# Patient Record
Sex: MALE | Race: WHITE | NOT HISPANIC OR LATINO | Employment: FULL TIME | URBAN - NONMETROPOLITAN AREA
[De-identification: names, ages, dates, MRNs, and addresses within clinical notes are randomized per-mention and may not be internally consistent; named-entity substitution may affect disease eponyms.]

---

## 2019-06-03 ENCOUNTER — OFFICE VISIT (OUTPATIENT)
Dept: CARDIOLOGY | Facility: CLINIC | Age: 54
End: 2019-06-03

## 2019-06-03 VITALS
HEART RATE: 56 BPM | DIASTOLIC BLOOD PRESSURE: 90 MMHG | BODY MASS INDEX: 32.23 KG/M2 | SYSTOLIC BLOOD PRESSURE: 138 MMHG | HEIGHT: 72 IN | WEIGHT: 238 LBS

## 2019-06-03 DIAGNOSIS — E11.9 TYPE 2 DIABETES MELLITUS WITHOUT COMPLICATION, WITHOUT LONG-TERM CURRENT USE OF INSULIN (HCC): ICD-10-CM

## 2019-06-03 DIAGNOSIS — I10 ESSENTIAL HYPERTENSION: ICD-10-CM

## 2019-06-03 DIAGNOSIS — E78.1 HYPERTRIGLYCERIDEMIA: ICD-10-CM

## 2019-06-03 DIAGNOSIS — R00.1 BRADYCARDIA, SINUS: ICD-10-CM

## 2019-06-03 DIAGNOSIS — R07.89 CHEST PAIN, ATYPICAL: ICD-10-CM

## 2019-06-03 DIAGNOSIS — G47.30 SLEEP APNEA IN ADULT: ICD-10-CM

## 2019-06-03 DIAGNOSIS — R06.02 SHORTNESS OF BREATH: Primary | ICD-10-CM

## 2019-06-03 DIAGNOSIS — E88.81 METABOLIC SYNDROME: ICD-10-CM

## 2019-06-03 PROBLEM — E88.810 METABOLIC SYNDROME: Status: ACTIVE | Noted: 2019-06-03

## 2019-06-03 PROCEDURE — 93000 ELECTROCARDIOGRAM COMPLETE: CPT | Performed by: INTERNAL MEDICINE

## 2019-06-03 PROCEDURE — 99244 OFF/OP CNSLTJ NEW/EST MOD 40: CPT | Performed by: INTERNAL MEDICINE

## 2019-06-03 RX ORDER — HYDROCHLOROTHIAZIDE 25 MG/1
25 TABLET ORAL DAILY
Qty: 30 TABLET | Refills: 11 | Status: SHIPPED | OUTPATIENT
Start: 2019-06-03 | End: 2021-04-02

## 2019-06-03 RX ORDER — ASPIRIN 81 MG/1
81 TABLET ORAL DAILY
COMMUNITY

## 2019-06-03 RX ORDER — FENOFIBRATE 145 MG/1
145 TABLET, COATED ORAL DAILY
COMMUNITY

## 2019-06-03 RX ORDER — CHLORAL HYDRATE 500 MG
1000 CAPSULE ORAL
COMMUNITY

## 2019-06-03 RX ORDER — LOSARTAN POTASSIUM 50 MG/1
50 TABLET ORAL DAILY
COMMUNITY
End: 2021-09-14 | Stop reason: SDUPTHER

## 2019-06-03 RX ORDER — ESOMEPRAZOLE MAGNESIUM 40 MG/1
40 CAPSULE, DELAYED RELEASE ORAL
COMMUNITY

## 2019-06-03 NOTE — PROGRESS NOTES
Chief Complaint   Patient presents with   • Establish Care     Referred for shortness of breath. 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 Reports that he has had some chest pain, but nothing he would count as major. Reports that he occasionally has some palpitations. Requested labs from PCP. Records from [facility] are in door, last stress and echo from 2009 and last office note from 2013.    • Aspirin     Patient is on aspirin.         CARDIAC COMPLAINTS  chest pressure/discomfort, dyspnea, fatigue and palpitations      Subjective   Brian LILI Winters is a 53 y.o. male came in today for his initial cardiac evaluation.  He has history of hypertension, diabetes and hypertriglyceridemia, who also has family history of stroke and ischemic heart disease.  I have seen him almost 6 years ago for chest discomfort and underwent investigation.  His LV function was normal.  He had no ischemia but he did have some mild hypertensive blood pressure response for which he was put on ACE inhibitors.  I have not seen him since then.  Apparently he started developing cough and the ACE inhibitors was changed to ARB.  He recently started noticing increasing shortness of breath for the last few months.  He was coaching softball during the spring and he started noticing increasing shortness of breath on exertion associated with chest discomfort.  The chest discomfort was more pressure-like feeling which occurs both during exertion as well as at rest.  The coaching is over now and he and his wife have been trying to exercise more regularly.  He is feeling little better now.  He apparently had some labs done at your office recently but he does not know the results.  Since the symptoms have restarted, he is now referred for further evaluation.  His A1c has been running pretty high in the past and is trying to get it down.  He is trying to go on a strict diet.  He does have history of snoring and he  also has history of falling asleep during the daytime.  He is not a smoker and has stated does have significant family history of vascular disease.  He is not taking any statins at this time.    Past Surgical History:   Procedure Laterality Date   • CARDIOVASCULAR STRESS TEST  07/14/2009    9 Min. 10.2 METS. 86% THR. BP- 184/92. EF 64%. Negative.   • ECHO - CONVERTED  05/12/2009    EF > 60%. Mild MR. RVSP- 36 mmHg.       Current Outpatient Medications   Medication Sig Dispense Refill   • aspirin 81 MG EC tablet Take 81 mg by mouth Daily.     • Cholecalciferol (VITAMIN D PO) Take  by mouth.     • CINNAMON PO Take  by mouth.     • Coenzyme Q10 (COQ-10 PO) Take  by mouth.     • esomeprazole (nexIUM) 40 MG capsule Take 40 mg by mouth Every Morning Before Breakfast.     • fenofibrate (TRICOR) 145 MG tablet Take 145 mg by mouth Daily.     • losartan (COZAAR) 50 MG tablet Take 50 mg by mouth Daily.     • metFORMIN (GLUCOPHAGE) 500 MG tablet Take 500 mg by mouth 3 (Three) Times a Day.     • Omega-3 Fatty Acids (FISH OIL) 1000 MG capsule capsule Take 1,000 mg by mouth Daily With Breakfast.     • hydrochlorothiazide (HYDRODIURIL) 25 MG tablet Take 1 tablet by mouth Daily. 30 tablet 11     No current facility-administered medications for this visit.            ALLERGIES:  Lisinopril    Past Medical History:   Diagnosis Date   • Diabetes mellitus (CMS/HCC)    • History of vasectomy    • Hyperlipidemia    • Hypertension    • Palpitations    • Paresthesia of skin    • Testicular hypofunction        Social History     Tobacco Use   Smoking Status Never Smoker   Smokeless Tobacco Never Used          Family History   Problem Relation Age of Onset   • Transient ischemic attack Mother    • Hypertension Mother    • Diabetes Mother    • Stroke Father    • Transient ischemic attack Father    • Hypertension Father    • Diabetes Brother    • Heart attack Maternal Uncle    • Diabetes Paternal Grandmother    • Diabetes Paternal Grandfather   "      Review of Systems   Constitution: Positive for malaise/fatigue. Negative for decreased appetite.   HENT: Negative for congestion and sore throat.    Eyes: Negative for blurred vision.   Cardiovascular: Positive for chest pain, dyspnea on exertion and palpitations.   Respiratory: Positive for shortness of breath and snoring.    Endocrine: Negative for cold intolerance and heat intolerance.   Hematologic/Lymphatic: Negative for adenopathy. Does not bruise/bleed easily.   Skin: Negative for itching, nail changes and skin cancer.   Musculoskeletal: Negative for arthritis and myalgias.   Gastrointestinal: Negative for abdominal pain, dysphagia and heartburn.   Genitourinary: Negative for bladder incontinence and frequency.   Neurological: Positive for excessive daytime sleepiness. Negative for dizziness, light-headedness, seizures and vertigo.   Psychiatric/Behavioral: Negative for altered mental status.   Allergic/Immunologic: Negative for environmental allergies and hives.       Diabetes- Yes  Thyroid- normal    Objective     /90 (BP Location: Right arm)   Pulse 56   Ht 182.9 cm (72\")   Wt 108 kg (238 lb)   BMI 32.28 kg/m²     Physical Exam   Constitutional: He is oriented to person, place, and time. He appears well-developed and well-nourished.   HENT:   Head: Normocephalic.   Nose: Nose normal.   Eyes: EOM are normal. Pupils are equal, round, and reactive to light.   Neck: Normal range of motion. Neck supple.   Cardiovascular: Regular rhythm, S1 normal and S2 normal. Bradycardia present.   Murmur heard.  Pulmonary/Chest: Effort normal and breath sounds normal.   Abdominal: Soft. Bowel sounds are normal.   Musculoskeletal: Normal range of motion. He exhibits no edema.   Neurological: He is alert and oriented to person, place, and time.   Skin: Skin is warm and dry.   Psychiatric: He has a normal mood and affect.         ECG 12 Lead  Date/Time: 6/3/2019 12:14 PM  Performed by: Talha Melvin, " MD  Authorized by: Talha Melvin MD   Previous ECG: no previous ECG available  Rhythm: sinus bradycardia  Rate: bradycardic  T inversion: II, III and aVF  QRS axis: normal    Clinical impression: non-specific ECG              Assessment/Plan   Patient's Body mass index is 32.28 kg/m². BMI is above normal parameters. Recommendations include: educational material, exercise counseling and nutrition counseling.     Brian was seen today for establish care and aspirin.    Diagnoses and all orders for this visit:    Shortness of breath  -     hydrochlorothiazide (HYDRODIURIL) 25 MG tablet; Take 1 tablet by mouth Daily.  -     Adult Transthoracic Echo Complete W/ Cont if Necessary Per Protocol; Future    Chest pain, atypical  -     Stress Test With Myocardial Perfusion One Day; Future    Metabolic syndrome    Essential hypertension  -     hydrochlorothiazide (HYDRODIURIL) 25 MG tablet; Take 1 tablet by mouth Daily.    Type 2 diabetes mellitus without complication, without long-term current use of insulin (CMS/[facility])    Hypertriglyceridemia    Bradycardia, sinus    Sleep apnea in adult  -     Overnight Sleep Oximetry Study; Future    At baseline, he is bradycardic with mild increased blood pressure.  His EKG showed sinus bradycardia with T wave changes in the inferior leads.  His clinical examination reveals a BMI of 32.  He does have short systolic murmur at the mitral area and there is no evidence of pedal edema.  I had a very long talk with him about his BMI.  I talked to him about different diets and gave him papers on Mediterranean diet.  Regarding his blood pressure, I started him on hydrochlorothiazide 25 mg once a day which might help with his shortness of breath also.  Regarding the diabetes, he is advised to talk to you.  Regarding the hypertriglyceridemia he is on TriCor.  If his LDL is high, consider changing it to a statin.  Regarding the bradycardia he is completely asymptomatic we will continue to  observe.  He does have symptoms of sleep apnea so I am going to check his nocturnal pulse PO2 measurement.  If it is abnormal he may need to undergo sleep study.  Regarding the chest pain, it is atypical and it could be related to blood pressure but I cannot rule out ischemia.  I scheduled him to undergo a repeat stress test to evaluate his functional status, chronotropic response, blood pressure response and to rule out stress-induced ischemia.  He also need an echocardiogram to evaluate for LVH, valvular structures and the PA pressure.  Based on the results, further recommendations will be made.  Regarding his diabetes, I talked to him about freestyle gisselle to monitor it more closely.  If his postprandial blood sugar goes up pretty high, consider adding Prandin.               Electronically signed by Talha Melvin MD Lenore 3, 2019 12:03 PM

## 2019-06-03 NOTE — PATIENT INSTRUCTIONS
Mediterranean Diet  A Mediterranean diet refers to food and lifestyle choices that are based on the traditions of countries located on the [place]. This way of eating has been shown to help prevent certain conditions and improve outcomes for people who have chronic diseases, like kidney disease and heart disease.  What are tips for following this plan?  Lifestyle  · Cook and eat meals together with your family, when possible.  · Drink enough fluid to keep your urine clear or pale yellow.  · Be physically active every day. This includes:  ? Aerobic exercise like running or swimming.  ? Leisure activities like gardening, walking, or housework.  · Get 7-8 hours of sleep each night.  · If recommended by your health care provider, drink red wine in moderation. This means 1 glass a day for nonpregnant women and 2 glasses a day for men. A glass of wine equals 5 oz (150 mL).  Reading food labels  · Check the serving size of packaged foods. For foods such as rice and pasta, the serving size refers to the amount of cooked product, not dry.  · Check the total fat in packaged foods. Avoid foods that have saturated fat or trans fats.  · Check the ingredients list for added sugars, such as corn syrup.  Shopping  · At the grocery store, buy most of your food from the areas near the walls of the store. This includes:  ? Fresh fruits and vegetables (produce).  ? Grains, beans, nuts, and seeds. Some of these may be available in unpackaged forms or large amounts (in bulk).  ? Fresh seafood.  ? Poultry and eggs.  ? Low-fat dairy products.  · Buy whole ingredients instead of prepackaged foods.  · Buy fresh fruits and vegetables in-season from local farmers markets.  · Buy frozen fruits and vegetables in resealable bags.  · If you do not have access to quality fresh seafood, buy precooked frozen shrimp or canned fish, such as tuna, salmon, or sardines.  · Buy small amounts of raw or cooked vegetables, salads, or olives from the  deli or salad bar at your store.  · Stock your pantry so you always have certain foods on hand, such as olive oil, canned tuna, canned tomatoes, rice, pasta, and beans.  Cooking  · Cook foods with extra-virgin olive oil instead of using butter or other vegetable oils.  · Have meat as a side dish, and have vegetables or grains as your main dish. This means having meat in small portions or adding small amounts of meat to foods like pasta or stew.  · Use beans or vegetables instead of meat in common dishes like chili or lasagna.  · [place] with different cooking methods. Try roasting or broiling vegetables instead of steaming or sautéeing them.  · Add frozen vegetables to soups, stews, pasta, or rice.  · Add nuts or seeds for added healthy fat at each meal. You can add these to yogurt, salads, or vegetable dishes.  · Marinate fish or vegetables using olive oil, lemon juice, garlic, and fresh herbs.  Meal planning  · Plan to eat 1 vegetarian meal one day each week. Try to work up to 2 vegetarian meals, if possible.  · Eat seafood 2 or more times a week.  · Have healthy snacks readily available, such as:  ? Vegetable sticks with hummus.  ? Greek yogurt.  ? Fruit and nut trail mix.  · Eat balanced meals throughout the week. This includes:  ? Fruit: 2-3 servings a day  ? Vegetables: 4-5 servings a day  ? Low-fat dairy: 2 servings a day  ? Fish, poultry, or lean meat: 1 serving a day  ? Beans and legumes: 2 or more servings a week  ? Nuts and seeds: 1-2 servings a day  ? Whole grains: 6-8 servings a day  ? Extra-virgin olive oil: 3-4 servings a day  · Limit red meat and sweets to only a few servings a month  What are my food choices?  · Mediterranean diet  ? Recommended  ? Grains: Whole-grain pasta. Brown rice. Bulgar wheat. Polenta. Couscous. Whole-wheat bread. Oatmeal. Quinoa.  ? Vegetables: Artichokes. Beets. Broccoli. Cabbage. Carrots. Eggplant. Green beans. Chard. Kale. Spinach. Onions. Leeks. Peas. Squash.  Tomatoes. Peppers. Radishes.  ? Fruits: Apples. Apricots. Avocado. Berries. Bananas. Cherries. Dates. Figs. Grapes. Astrid. Melon. Oranges. Peaches. Plums. Pomegranate.  ? Meats and other protein foods: Beans. Almonds. Sunflower seeds. Pine nuts. Peanuts. Cod. Mccammon. Scallops. Shrimp. Tuna. Tilapia. Clams. Oysters. Eggs.  ? Dairy: Low-fat milk. Cheese. Greek yogurt.  ? Beverages: Water. Red wine. Herbal tea.  ? Fats and oils: Extra virgin olive oil. Avocado oil. Grape seed oil.  ? Sweets and desserts: Greek yogurt with honey. Baked apples. Poached pears. Trail mix.  ? Seasoning and other foods: Basil. Cilantro. Coriander. Cumin. Mint. Parsley. Jose Cruz. Rosemary. Tarragon. Garlic. Oregano. Thyme. Pepper. Balsalmic vinegar. Tahini. Hummus. Tomato sauce. Olives. Mushrooms.  ? Limit these  ? Grains: Prepackaged pasta or rice dishes. Prepackaged cereal with added sugar.  ? Vegetables: Deep fried potatoes (french fries).  ? Fruits: Fruit canned in syrup.  ? Meats and other protein foods: Beef. Pork. Lamb. Poultry with skin. Hot dogs. Brown.  ? Dairy: Ice cream. Sour cream. Whole milk.  ? Beverages: Juice. Sugar-sweetened soft drinks. Beer. Liquor and spirits.  ? Fats and oils: Butter. Canola oil. Vegetable oil. Beef fat (tallow). Lard.  ? Sweets and desserts: Cookies. Cakes. Pies. Candy.  ? Seasoning and other foods: Mayonnaise. Premade sauces and marinades.  ? The items listed may not be a complete list. Talk with your dietitian about what dietary choices are right for you.  Summary  · The Mediterranean diet includes both food and lifestyle choices.  · Eat a variety of fresh fruits and vegetables, beans, nuts, seeds, and whole grains.  · Limit the amount of red meat and sweets that you eat.  · Talk with your health care provider about whether it is safe for you to drink red wine in moderation. This means 1 glass a day for nonpregnant women and 2 glasses a day for men. A glass of wine equals 5 oz (150 mL).  This information  is not intended to replace advice given to you by your health care provider. Make sure you discuss any questions you have with your health care provider.  Document Released: 08/10/2017 Document Revised: 09/12/2017 Document Reviewed: 08/10/2017  [employer] Interactive Patient Education © 2019 [employer]

## 2019-06-06 ENCOUNTER — APPOINTMENT (OUTPATIENT)
Dept: CARDIOLOGY | Facility: HOSPITAL | Age: 54
End: 2019-06-06

## 2019-09-17 ENCOUNTER — OFFICE VISIT (OUTPATIENT)
Dept: CARDIOLOGY | Facility: CLINIC | Age: 54
End: 2019-09-17

## 2019-09-17 VITALS
BODY MASS INDEX: 33.67 KG/M2 | WEIGHT: 248.6 LBS | DIASTOLIC BLOOD PRESSURE: 98 MMHG | SYSTOLIC BLOOD PRESSURE: 148 MMHG | HEIGHT: 72 IN | HEART RATE: 60 BPM

## 2019-09-17 DIAGNOSIS — I10 ESSENTIAL HYPERTENSION: Primary | ICD-10-CM

## 2019-09-17 DIAGNOSIS — E11.9 TYPE 2 DIABETES MELLITUS WITHOUT COMPLICATION, WITHOUT LONG-TERM CURRENT USE OF INSULIN (HCC): ICD-10-CM

## 2019-09-17 DIAGNOSIS — R06.02 SHORTNESS OF BREATH: ICD-10-CM

## 2019-09-17 DIAGNOSIS — E88.81 METABOLIC SYNDROME: ICD-10-CM

## 2019-09-17 DIAGNOSIS — E78.1 HYPERTRIGLYCERIDEMIA: ICD-10-CM

## 2019-09-17 PROCEDURE — 99214 OFFICE O/P EST MOD 30 MIN: CPT | Performed by: NURSE PRACTITIONER

## 2019-09-17 NOTE — PROGRESS NOTES
Chief Complaint   Patient presents with   • Follow-up     Cardiac management. He did not have stress and echo due to copay. He did not receive any results of overnight sleep ox.   • Shortness of Breath     He has had no further problems, he feels problems related to allergies.   • Blood pressure     He reports stable at home. He has not been taking HCTZ.   • Palpitations     Has occasional yet not often only last 3-4 seconds.       Álvaro Winters is a 53 y.o. male with a history of HTN, diabetes, hyperlipidemia and strong family history of stroke and heart disease.  Cardiac work-up in 2009 was negative.  We did not see him for a few years until recently when he returned for evaluation of increasing MCMULLEN and chest discomfort.  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 At consult, HCTZ 25 mg add for elevated bp also the SOB. Cardiac work up was ordered but not completed secondary to exorbitant cost >$2500. Overnight pulse ox was negative. Labs on 5/4/2019 showed  A1C had increased from 6.8 to 10.7%, magnesium 1.9, TSH 3.01, testosterone 212, , , HDL 40, . He admitted to not following a good diet and eating fast food.  Today comes for follow-up feeling a little better.  Shortness of breath has improved.  Blood pressure has been well controlled at home and he has not been taking HCTZ.  He has infrequent, intermittent palpitations where he feels his heart skip a couple times then subsides.  Not associated with dizziness or syncope.    HPI         Cardiac History:    Past Surgical History:   Procedure Laterality Date   • CARDIOVASCULAR STRESS TEST  07/14/2009    9 Min. 10.2 METS. 86% THR. BP- 184/92. EF 64%. Negative.   • ECHO - CONVERTED  05/12/2009    EF > 60%. Mild MR. RVSP- 36 mmHg.       Current Outpatient Medications   Medication Sig Dispense Refill   • aspirin 81 MG EC tablet Take 81 mg by mouth Daily.     • Cholecalciferol (VITAMIN D PO) Take  by  mouth Daily.     • CINNAMON PO Take  by mouth Daily.     • Coenzyme Q10 (COQ-10 PO) Take  by mouth Daily.     • esomeprazole (nexIUM) 40 MG capsule Take 40 mg by mouth Every Morning Before Breakfast.     • fenofibrate (TRICOR) 145 MG tablet Take 145 mg by mouth Daily.     • hydrochlorothiazide (HYDRODIURIL) 25 MG tablet Take 1 tablet by mouth Daily. (Patient taking differently: Take 25 mg by mouth Daily As Needed.) 30 tablet 11   • losartan (COZAAR) 50 MG tablet Take 50 mg by mouth Daily.     • metFORMIN (GLUCOPHAGE) 500 MG tablet Take 500 mg by mouth 3 (Three) Times a Day.     • Omega-3 Fatty Acids (FISH OIL) 1000 MG capsule capsule Take 1,000 mg by mouth Daily With Breakfast.       No current facility-administered medications for this visit.      Lisinopril    Past Medical History:   Diagnosis Date   • Diabetes mellitus (CMS/[facility])    • History of vasectomy    • Hyperlipidemia    • Hypertension    • Palpitations    • Paresthesia of skin    • Testicular hypofunction        Social History     Socioeconomic History   • Marital status:      Spouse name: Not on file   • Number of children: Not on file   • Years of education: Not on file   • Highest education level: Not on file   Tobacco Use   • Smoking status: Never Smoker   • Smokeless tobacco: Never Used   Substance and Sexual Activity   • Alcohol use: Yes     Comment: Occasional   • Drug use: No       Family History   Problem Relation Age of Onset   • Transient ischemic attack Mother    • Hypertension Mother    • Diabetes Mother    • Stroke Father    • Transient ischemic attack Father    • Hypertension Father    • Diabetes Brother    • Heart attack Maternal Uncle    • Diabetes Paternal Grandmother    • Diabetes Paternal Grandfather      Review of Systems   Constitution: Positive for weight gain (increased 10 lb). Negative for decreased appetite, weakness and malaise/fatigue.   HENT: Negative.    Eyes: Negative.    Cardiovascular: Positive for palpitations.  "Negative for chest pain, dyspnea on exertion, leg swelling and orthopnea.   Respiratory: Negative for cough and shortness of breath.    Endocrine: Negative.    Hematologic/Lymphatic: Negative.    Skin: Negative.    Musculoskeletal: Negative.    Gastrointestinal: Negative for abdominal pain and melena.   Genitourinary: Negative for hematuria.   Neurological: Negative for dizziness.   Psychiatric/Behavioral: Negative for altered mental status and depression.   Allergic/Immunologic: Negative.       Diabetes- Yes  Thyroid-normal    Objective     /98 (BP Location: Left arm)   Pulse 60   Ht 182.9 cm (72.01\")   Wt 113 kg (248 lb 9.6 oz)   BMI 33.71 kg/m²      Physical Exam   Constitutional: He is oriented to person, place, and time. He appears well-developed and well-nourished. No distress.   HENT:   Head: Normocephalic.   Eyes: Pupils are equal, round, and reactive to light.   Neck: Normal range of motion.   Cardiovascular: Normal rate, regular rhythm and intact distal pulses.   No murmur heard.  Pulmonary/Chest: Effort normal and breath sounds normal.   Abdominal: Soft. Bowel sounds are normal.   Musculoskeletal: Normal range of motion. He exhibits no edema.   Neurological: He is alert and oriented to person, place, and time.   Skin: Skin is warm and dry. He is not diaphoretic.   Psychiatric: He has a normal mood and affect.   Nursing note and vitals reviewed.     Procedures          Assessment/Plan      Brian was seen today for follow-up, shortness of breath, blood pressure and palpitations.    Diagnoses and all orders for this visit:    Essential hypertension    Hypertriglyceridemia    Shortness of breath    Type 2 diabetes mellitus without complication, without long-term current use of insulin (CMS/[facility])    Metabolic syndrome    1. HTN- elevated today at 148/98. He did have quite a long wait time. Reports lower at home. He is advised to resume daily HCTZ. Continue to monitor with goal of <130/80 with his age " and comorbid diabetes. Limit sodium 1500 mg daily. Weight loss to achieve BMI near 25.     2. Hypertriglyceridemia- not well controlled. Lipids on 5/4/19: , Tri 282, HDL 40, . All numbers increased from near normal in December to elevated in May.  He feels likely due to lack of good diet control.  He has continued fenofibrate and fish oil.  If he is unable to alter his diet significantly, he would benefit from statin therapy in addition to fenofibrate.  Fish oil can be increased to capsules twice daily.  Limit sugars, carbohydrates, saturated fat.    3.  Shortness of breath- improved.  Overnight pulse ox negative.  Clinically, lungs are clear.  Will delay his cardiac work-up until his symptoms are worsened.    4.  Diabetes- not well controlled, A1C 10.7%.  Encouraged to strictly limit sugars and carbohydrates diabetes management per PCP, continue aspirin and ARB.    He was counseled on heart healthy lifestyle including following Mediterranean-style diet, limiting sodium, regular exercise to include 150 minutes moderate intensity exercise weekly.  We will see him back in 6 months for follow-up to ensure he has optimal risk factor management and can be seen yearly or as needed.  If the palpitations worsen, I encouraged him to contact our office and we will place a Holter monitor for further evaluation.      Patient's Body mass index is 33.71 kg/m². BMI is above normal parameters. Recommendations include: nutrition counseling.              Electronically signed by LEXII Ureña,  September 20, 2019 10:59 AM

## 2019-09-17 NOTE — PATIENT INSTRUCTIONS
Mediterranean Diet  A Mediterranean diet refers to food and lifestyle choices that are based on the traditions of countries located on the [place]. This way of eating has been shown to help prevent certain conditions and improve outcomes for people who have chronic diseases, like kidney disease and heart disease.  What are tips for following this plan?  Lifestyle  · Cook and eat meals together with your family, when possible.  · Drink enough fluid to keep your urine clear or pale yellow.  · Be physically active every day. This includes:  ? Aerobic exercise like running or swimming.  ? Leisure activities like gardening, walking, or housework.  · Get 7-8 hours of sleep each night.  · If recommended by your health care provider, drink red wine in moderation. This means 1 glass a day for nonpregnant women and 2 glasses a day for men. A glass of wine equals 5 oz (150 mL).  Reading food labels    · Check the serving size of packaged foods. For foods such as rice and pasta, the serving size refers to the amount of cooked product, not dry.  · Check the total fat in packaged foods. Avoid foods that have saturated fat or trans fats.  · Check the ingredients list for added sugars, such as corn syrup.  Shopping  · At the grocery store, buy most of your food from the areas near the walls of the store. This includes:  ? Fresh fruits and vegetables (produce).  ? Grains, beans, nuts, and seeds. Some of these may be available in unpackaged forms or large amounts (in bulk).  ? Fresh seafood.  ? Poultry and eggs.  ? Low-fat dairy products.  · Buy whole ingredients instead of prepackaged foods.  · Buy fresh fruits and vegetables in-season from local farmers markets.  · Buy frozen fruits and vegetables in resealable bags.  · If you do not have access to quality fresh seafood, buy precooked frozen shrimp or canned fish, such as tuna, salmon, or sardines.  · Buy small amounts of raw or cooked vegetables, salads, or olives from  the deli or salad bar at your store.  · Stock your pantry so you always have certain foods on hand, such as olive oil, canned tuna, canned tomatoes, rice, pasta, and beans.  Cooking  · Cook foods with extra-virgin olive oil instead of using butter or other vegetable oils.  · Have meat as a side dish, and have vegetables or grains as your main dish. This means having meat in small portions or adding small amounts of meat to foods like pasta or stew.  · Use beans or vegetables instead of meat in common dishes like chili or lasagna.  · [place] with different cooking methods. Try roasting or broiling vegetables instead of steaming or sautéeing them.  · Add frozen vegetables to soups, stews, pasta, or rice.  · Add nuts or seeds for added healthy fat at each meal. You can add these to yogurt, salads, or vegetable dishes.  · Marinate fish or vegetables using olive oil, lemon juice, garlic, and fresh herbs.  Meal planning    · Plan to eat 1 vegetarian meal one day each week. Try to work up to 2 vegetarian meals, if possible.  · Eat seafood 2 or more times a week.  · Have healthy snacks readily available, such as:  ? Vegetable sticks with hummus.  ? Greek yogurt.  ? Fruit and nut trail mix.  · Eat balanced meals throughout the week. This includes:  ? Fruit: 2-3 servings a day  ? Vegetables: 4-5 servings a day  ? Low-fat dairy: 2 servings a day  ? Fish, poultry, or lean meat: 1 serving a day  ? Beans and legumes: 2 or more servings a week  ? Nuts and seeds: 1-2 servings a day  ? Whole grains: 6-8 servings a day  ? Extra-virgin olive oil: 3-4 servings a day  · Limit red meat and sweets to only a few servings a month  What are my food choices?  · Mediterranean diet  ? Recommended  ? Grains: Whole-grain pasta. Brown rice. Bulgar wheat. Polenta. Couscous. Whole-wheat bread. Oatmeal. Quinoa.  ? Vegetables: Artichokes. Beets. Broccoli. Cabbage. Carrots. Eggplant. Green beans. Chard. Kale. Spinach. Onions. Leeks. Peas. Squash.  Tomatoes. Peppers. Radishes.  ? Fruits: Apples. Apricots. Avocado. Berries. Bananas. Cherries. Dates. Figs. Grapes. Astrid. Melon. Oranges. Peaches. Plums. Pomegranate.  ? Meats and other protein foods: Beans. Almonds. Sunflower seeds. Pine nuts. Peanuts. Cod. [place]. Scallops. Shrimp. Tuna. Tilapia. Clams. Oysters. Eggs.  ? Dairy: Low-fat milk. Cheese. Greek yogurt.  ? Beverages: Water. Red wine. Herbal tea.  ? Fats and oils: Extra virgin olive oil. Avocado oil. Grape seed oil.  ? Sweets and desserts: Greek yogurt with honey. Baked apples. Poached pears. Trail mix.  ? Seasoning and other foods: Basil. Cilantro. Coriander. Cumin. Mint. Parsley. [place]. Rosemary. Tarragon. Garlic. Oregano. Thyme. Pepper. Balsalmic vinegar. Tahini. Hummus. Tomato sauce. Olives. Mushrooms.  ? Limit these  ? Grains: Prepackaged pasta or rice dishes. Prepackaged cereal with added sugar.  ? Vegetables: Deep fried potatoes (french fries).  ? Fruits: Fruit canned in syrup.  ? Meats and other protein foods: Beef. Pork. Lamb. Poultry with skin. Hot dogs. Brown.  ? Dairy: Ice cream. Sour cream. Whole milk.  ? Beverages: Juice. Sugar-sweetened soft drinks. Beer. Liquor and spirits.  ? Fats and oils: Butter. Canola oil. Vegetable oil. Beef fat (tallow). Lard.  ? Sweets and desserts: Cookies. Cakes. Pies. Candy.  ? Seasoning and other foods: Mayonnaise. Premade sauces and marinades.  ? The items listed may not be a complete list. Talk with your dietitian about what dietary choices are right for you.  Summary  · The Mediterranean diet includes both food and lifestyle choices.  · Eat a variety of fresh fruits and vegetables, beans, nuts, seeds, and whole grains.  · Limit the amount of red meat and sweets that you eat.  · Talk with your health care provider about whether it is safe for you to drink red wine in moderation. This means 1 glass a day for nonpregnant women and 2 glasses a day for men. A glass of wine equals 5 oz (150 mL).  This information  is not intended to replace advice given to you by your health care provider. Make sure you discuss any questions you have with your health care provider.  Document Released: 08/10/2017 Document Revised: 09/12/2017 Document Reviewed: 08/10/2017  [employer] Interactive Patient Education © 2019 [employer]

## 2020-06-02 ENCOUNTER — OFFICE VISIT (OUTPATIENT)
Dept: CARDIOLOGY | Facility: CLINIC | Age: 55
End: 2020-06-02

## 2020-06-02 VITALS
HEIGHT: 72 IN | HEART RATE: 68 BPM | WEIGHT: 249 LBS | BODY MASS INDEX: 33.72 KG/M2 | SYSTOLIC BLOOD PRESSURE: 136 MMHG | DIASTOLIC BLOOD PRESSURE: 78 MMHG

## 2020-06-02 DIAGNOSIS — E66.9 OBESITY (BMI 30.0-34.9): ICD-10-CM

## 2020-06-02 DIAGNOSIS — E11.9 TYPE 2 DIABETES MELLITUS WITHOUT COMPLICATION, WITHOUT LONG-TERM CURRENT USE OF INSULIN (HCC): ICD-10-CM

## 2020-06-02 DIAGNOSIS — I10 ESSENTIAL HYPERTENSION: Primary | ICD-10-CM

## 2020-06-02 DIAGNOSIS — E78.1 HYPERTRIGLYCERIDEMIA: ICD-10-CM

## 2020-06-02 PROCEDURE — 99213 OFFICE O/P EST LOW 20 MIN: CPT | Performed by: NURSE PRACTITIONER

## 2020-06-02 NOTE — PROGRESS NOTES
Chief Complaint   Patient presents with   • Follow-up     For cardiac management. Patient is on aspirin. Last lab work was done a few weeks ago, was faxed, will put in door.    • Med Refill     PCP does medication refills. Went over medications verbally.        Cardiac Complaints  none      Subjective   Brian LILI Winters is a 54 y.o. male  HTN, diabetes, hyperlipidemia, and strong family history of stroke and heart disease.  Cardiac work-up in 2009 was negative.  We did not see him for a few years until recently when he returned for evaluation of increasing MCMULLEN and chest discomfort.  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 At consult, HCTZ 25 mg add for elevated bp also the SOB. Cardiac work up was ordered but not completed secondary to exorbitant cost >$2500. Overnight pulse ox was negative.  Most recent labs from May 2020:  AIC 7.1%, HH 14.9/43.1, PSA 0.4, Testosterone 231, TRIG 131, HDL 42, , GFR 77, Ca 9.1, K 4.0, Na 141, Creatinine 1.08, BUN 11.    Patient returns today for follow up and denies any cardiac concerns. Patient denies chest pain, SOA, palpitations, dizziness, or syncope. He admits he has been trying to do better with his diet and limit his amount of caloric intake as well as carbs since his AIC was so high on his last labs. No refills needed.               Cardiac History  Past Surgical History:   Procedure Laterality Date   • CARDIOVASCULAR STRESS TEST  07/14/2009    9 Min. 10.2 METS. 86% THR. BP- 184/92. EF 64%. Negative.   • ECHO - CONVERTED  05/12/2009    EF > 60%. Mild MR. RVSP- 36 mmHg.       Current Outpatient Medications   Medication Sig Dispense Refill   • aspirin 81 MG EC tablet Take 81 mg by mouth Daily.     • Cholecalciferol (VITAMIN D PO) Take  by mouth Daily.     • CINNAMON PO Take  by mouth Daily.     • Coenzyme Q10 (COQ-10 PO) Take  by mouth Daily.     • esomeprazole (nexIUM) 40 MG capsule Take 40 mg by mouth Every Morning Before Breakfast.     •  fenofibrate (TRICOR) 145 MG tablet Take 145 mg by mouth Daily.     • hydrochlorothiazide (HYDRODIURIL) 25 MG tablet Take 1 tablet by mouth Daily. 30 tablet 11   • losartan (COZAAR) 50 MG tablet Take 50 mg by mouth Daily.     • metFORMIN (GLUCOPHAGE) 500 MG tablet Take 1,000 mg by mouth 2 (Two) Times a Day With Meals.     • Omega-3 Fatty Acids (FISH OIL) 1000 MG capsule capsule Take 1,000 mg by mouth Daily With Breakfast.       No current facility-administered medications for this visit.        Lisinopril    Past Medical History:   Diagnosis Date   • Diabetes mellitus (CMS/HCC)    • History of vasectomy    • Hyperlipidemia    • Hypertension    • Palpitations    • Paresthesia of skin    • Testicular hypofunction        Social History     Socioeconomic History   • Marital status:      Spouse name: Not on file   • Number of children: Not on file   • Years of education: Not on file   • Highest education level: Not on file   Tobacco Use   • Smoking status: Never Smoker   • Smokeless tobacco: Never Used   Substance and Sexual Activity   • Alcohol use: Yes     Comment: Occasional   • Drug use: No       Family History   Problem Relation Age of Onset   • Transient ischemic attack Mother    • Hypertension Mother    • Diabetes Mother    • Stroke Father    • Transient ischemic attack Father    • Hypertension Father    • Diabetes Brother    • Heart attack Maternal Uncle    • Diabetes Paternal Grandmother    • Diabetes Paternal Grandfather        Review of Systems   Constitution: Negative for malaise/fatigue and night sweats.   Cardiovascular: Negative for chest pain, claudication, dyspnea on exertion, irregular heartbeat, leg swelling, near-syncope, orthopnea, palpitations and syncope.   Respiratory: Negative for cough, shortness of breath and wheezing.    Musculoskeletal: Negative for back pain, joint pain and joint swelling.   Gastrointestinal: Negative for anorexia, heartburn, nausea and vomiting.   Genitourinary:  "Negative for dysuria, hematuria, hesitancy and nocturia.   Neurological: Negative for dizziness, light-headedness and loss of balance.   Psychiatric/Behavioral: Negative for depression and memory loss. The patient is not nervous/anxious.            Objective     /78 (BP Location: Right arm)   Pulse 68   Ht 182.9 cm (72.01\")   Wt 113 kg (249 lb)   BMI 33.76 kg/m²     Physical Exam   Constitutional: He is oriented to person, place, and time. He appears well-developed and well-nourished.   HENT:   Head: Normocephalic and atraumatic.   Eyes: Pupils are equal, round, and reactive to light. EOM are normal.   Neck: Normal range of motion. Neck supple.   Cardiovascular: Normal rate and regular rhythm.   Murmur heard.  Pulmonary/Chest: Effort normal and breath sounds normal.   Abdominal: Soft.   Musculoskeletal: Normal range of motion.   Neurological: He is alert and oriented to person, place, and time.   Skin: Skin is warm and dry.   Psychiatric: He has a normal mood and affect. His behavior is normal.       Procedures    Assessment/Plan     HTN: Blood pressure stable but SBP slightly high at 136.  Patient reports at home it has been well managed. He was urged to keep a log. He will continue on current HCTZ and cozaar therapy for now. Limited sodium intake encouraged.     Hyperlipidemia:  Managed with tricor therapy.  Most recent labs show TRIGS much improved. His LDL was noted to be high at 101.  Dietary changes urged. At next labs, if still elevated, please consider statin therapy due to risk factors for cardiac disease.    DM:  Most recent labs show AIC improved from over 10% to around 7.1%. He remains on glucophage therapy in regards and is followed by your office. He was urged to continue to limit sugar/starch intake in his diet.    BMI noted at 33.76, good cardiac ADA diet with limited caloric intake, carbs, and walking regimen encouraged.    6 month follow up recommended or sooner if needed.    NO refills " needed.    Cardiac status stable. Patient does not want any further testing at present as he remains asymptomatic and reports he can not afford secondary to costs.          Problems Addressed this Visit        Cardiovascular and Mediastinum    Hypertriglyceridemia    Essential hypertension - Primary       Endocrine    Type 2 diabetes mellitus without complication, without long-term current use of insulin (CMS/[facility])      Other Visit Diagnoses     Obesity (BMI 30.0-34.9)              Patient's Body mass index is 33.76 kg/m². BMI is above normal parameters. Recommendations include: nutrition counseling.                Electronically signed by LEXII Segal June 2, 2020 17:02

## 2021-02-24 ENCOUNTER — OFFICE VISIT (OUTPATIENT)
Dept: CARDIOLOGY | Facility: CLINIC | Age: 56
End: 2021-02-24

## 2021-02-24 VITALS
BODY MASS INDEX: 34.05 KG/M2 | DIASTOLIC BLOOD PRESSURE: 74 MMHG | SYSTOLIC BLOOD PRESSURE: 142 MMHG | WEIGHT: 251.4 LBS | HEIGHT: 72 IN | HEART RATE: 64 BPM | TEMPERATURE: 97.7 F

## 2021-02-24 DIAGNOSIS — E78.1 HYPERTRIGLYCERIDEMIA: ICD-10-CM

## 2021-02-24 DIAGNOSIS — I10 ESSENTIAL HYPERTENSION: Primary | ICD-10-CM

## 2021-02-24 DIAGNOSIS — E88.81 METABOLIC SYNDROME: ICD-10-CM

## 2021-02-24 DIAGNOSIS — E11.9 TYPE 2 DIABETES MELLITUS WITHOUT COMPLICATION, WITHOUT LONG-TERM CURRENT USE OF INSULIN (HCC): ICD-10-CM

## 2021-02-24 PROCEDURE — 99213 OFFICE O/P EST LOW 20 MIN: CPT | Performed by: NURSE PRACTITIONER

## 2021-02-24 RX ORDER — ORAL SEMAGLUTIDE 3 MG/1
3 TABLET ORAL DAILY
COMMUNITY
End: 2021-09-14 | Stop reason: SINTOL

## 2021-02-24 NOTE — PROGRESS NOTES
Chief Complaint   Patient presents with   • Follow-up     Cardiac management.   • Lab     Last labs 1/2021 per PCP, A1C was elevated. PCP started Ryvelsus 3mg daily.  PCP writes refills on medications.   • Shortness of Breath     Only with over exertion, had Covid 10/2020.     Álvaro Winters is a 55 y.o. male with HTN, diabetes, hyperlipidemia, and strong family history of stroke and heart disease.  Cardiac work-up in 2009 was negative.  We did not see him for a few years until recently when he returned for evaluation of increasing MCMULLEN and chest discomfort.  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 At consult, HCTZ 25 mg add for elevated bp also the SOB. Cardiac work up was ordered but not completed secondary to out-of-pocket cost >$2500. Overnight pulse ox was negative.      He came today for regular follow-up visit.  He denies any new or worsening cardiac symptoms.  He was diagnosed with Covid in October 2020.  Since then, he has noted mild increase in shortness of breath with exertion but slowly improving.  He admits to not following strict diet and not being as active while working virtually.  Labs brought in today showed on 12/31/2020 A1c increased from 7.1 to 8.4%, B12 356, TSH 2.2, CBC, vitamin D 26, testosterone 232, , HDL 40, , , GFR 90.  He was started on Rybelsus.         Cardiac History:    Past Surgical History:   Procedure Laterality Date   • CARDIOVASCULAR STRESS TEST  07/14/2009    9 Min. 10.2 METS. 86% THR. BP- 184/92. EF 64%. Negative.   • ECHO - CONVERTED  05/12/2009    EF > 60%. Mild MR. RVSP- 36 mmHg.     Current Outpatient Medications   Medication Sig Dispense Refill   • aspirin 81 MG EC tablet Take 81 mg by mouth Daily.     • Cholecalciferol (VITAMIN D PO) Take  by mouth Daily.     • CINNAMON PO Take  by mouth Daily.     • Coenzyme Q10 (COQ-10 PO) Take  by mouth Daily.     • esomeprazole (nexIUM) 40 MG capsule Take 40 mg by  mouth Every Morning Before Breakfast.     • fenofibrate (TRICOR) 145 MG tablet Take 145 mg by mouth Daily.     • hydrochlorothiazide (HYDRODIURIL) 25 MG tablet Take 1 tablet by mouth Daily. 30 tablet 11   • losartan (COZAAR) 50 MG tablet Take 50 mg by mouth Daily.     • metFORMIN (GLUCOPHAGE) 500 MG tablet Take 1,000 mg by mouth 2 (Two) Times a Day With Meals.     • Omega-3 Fatty Acids (FISH OIL) 1000 MG capsule capsule Take 1,000 mg by mouth Daily With Breakfast.     • Semaglutide (Rybelsus) 3 MG tablet Take 3 mg by mouth Daily. One tablet daily       No current facility-administered medications for this visit.      Lisinopril    Past Medical History:   Diagnosis Date   • Diabetes mellitus (CMS/HCC)    • History of vasectomy    • Hyperlipidemia    • Hypertension    • Palpitations    • Paresthesia of skin    • Testicular hypofunction      Social History     Socioeconomic History   • Marital status:      Spouse name: Not on file   • Number of children: Not on file   • Years of education: Not on file   • Highest education level: Not on file   Tobacco Use   • Smoking status: Never Smoker   • Smokeless tobacco: Never Used   Substance and Sexual Activity   • Alcohol use: Yes     Comment: Occasional   • Drug use: No     Family History   Problem Relation Age of Onset   • Transient ischemic attack Mother    • Hypertension Mother    • Diabetes Mother    • Stroke Father    • Transient ischemic attack Father    • Hypertension Father    • Diabetes Brother    • Heart attack Maternal Uncle    • Diabetes Paternal Grandmother    • Diabetes Paternal Grandfather      Review of Systems   Constitution: Positive for weight gain (Up 2 pounds). Negative for decreased appetite and malaise/fatigue.   HENT: Negative.    Eyes: Negative for blurred vision.   Cardiovascular: Negative for chest pain, dyspnea on exertion (Very mild), leg swelling, palpitations and syncope.   Respiratory: Negative for shortness of breath and sleep  "disturbances due to breathing.    Endocrine: Negative.    Hematologic/Lymphatic: Negative for bleeding problem. Does not bruise/bleed easily.   Skin: Negative.    Musculoskeletal: Negative for falls and myalgias.   Gastrointestinal: Negative for abdominal pain, heartburn and melena.   Genitourinary: Negative for hematuria.   Neurological: Negative for dizziness and light-headedness.   Psychiatric/Behavioral: Negative for altered mental status.   Allergic/Immunologic: Negative.       Objective     /74 (BP Location: Right arm)   Pulse 64   Temp 97.7 °F (36.5 °C)   Ht 182.9 cm (72.01\")   Wt 114 kg (251 lb 6.4 oz)   BMI 34.09 kg/m²     Vitals signs and nursing note reviewed.   Constitutional:       General: Not in acute distress.     Appearance: Well-developed. Not diaphoretic.   Eyes:      Pupils: Pupils are equal, round, and reactive to light.   HENT:      Head: Normocephalic.   Neck:      Musculoskeletal: Normal range of motion.   Pulmonary:      Effort: Pulmonary effort is normal. No respiratory distress.      Breath sounds: Normal breath sounds.   Cardiovascular:      Normal rate. Regular rhythm.      Murmurs: There is no murmur.   Pulses:     Intact distal pulses.   Edema:     Peripheral edema absent.   Abdominal:      General: Bowel sounds are normal.      Palpations: Abdomen is soft.   Musculoskeletal: Normal range of motion.   Skin:     General: Skin is warm and dry.   Neurological:      Mental Status: Alert and oriented to person, place, and time.        Procedures          Problem List Items Addressed This Visit        Cardiac and Vasculature    Hypertriglyceridemia    Essential hypertension - Primary       Endocrine and Metabolic    Type 2 diabetes mellitus without complication, without long-term current use of insulin (CMS/[facility])    Relevant Medications    Semaglutide (Rybelsus) 3 MG tablet    Metabolic syndrome         1.  HTN-upper normal range today at 142/74.  Limit sodium intake.  Continue " weight loss efforts.  Continue HCTZ 25 mg and losartan 50 mg daily.    2.  Hyperlipidemia- with triglycerides 238.  He is not on statin.  Continue fenofibrate and strict diet.    3.  Diabetes-A1c increased to 8.4%.  He is working hard to reduce his sugar and carbohydrate intake as well as increase activity.  He is tolerating Rybelsus so far.    He has no significant cardiac complaints and due to out-of-pocket cost, we will continue to manage him conservatively.  If he develops any anginal type symptoms, recommend repeating stress test with perfusion scan.  Refills managed by PCP.  Thank you for providing copy of his labs.  Follow-up in 6 months or sooner if needed.    Patient's Body mass index is 34.09 kg/m². BMI is above normal parameters. Recommendations include: nutrition counseling.             Electronically signed by LEXII Ureña,  February 25, 2021 11:57 EST

## 2021-03-01 DIAGNOSIS — I10 ESSENTIAL HYPERTENSION: ICD-10-CM

## 2021-03-01 DIAGNOSIS — R06.02 SHORTNESS OF BREATH: ICD-10-CM

## 2021-03-01 RX ORDER — HYDROCHLOROTHIAZIDE 25 MG/1
TABLET ORAL
Qty: 30 TABLET | Refills: 0 | OUTPATIENT
Start: 2021-03-01

## 2021-03-03 DIAGNOSIS — R06.02 SHORTNESS OF BREATH: ICD-10-CM

## 2021-03-03 DIAGNOSIS — I10 ESSENTIAL HYPERTENSION: ICD-10-CM

## 2021-03-04 RX ORDER — HYDROCHLOROTHIAZIDE 25 MG/1
TABLET ORAL
Qty: 30 TABLET | Refills: 0 | OUTPATIENT
Start: 2021-03-04

## 2021-04-01 DIAGNOSIS — R06.02 SHORTNESS OF BREATH: ICD-10-CM

## 2021-04-01 DIAGNOSIS — I10 ESSENTIAL HYPERTENSION: ICD-10-CM

## 2021-04-02 RX ORDER — HYDROCHLOROTHIAZIDE 25 MG/1
TABLET ORAL
Qty: 30 TABLET | Refills: 0 | Status: SHIPPED | OUTPATIENT
Start: 2021-04-02 | End: 2021-07-27

## 2021-07-26 DIAGNOSIS — R06.02 SHORTNESS OF BREATH: ICD-10-CM

## 2021-07-26 DIAGNOSIS — I10 ESSENTIAL HYPERTENSION: ICD-10-CM

## 2021-07-28 RX ORDER — HYDROCHLOROTHIAZIDE 25 MG/1
TABLET ORAL
Qty: 30 TABLET | Refills: 2 | Status: SHIPPED | OUTPATIENT
Start: 2021-07-28 | End: 2021-09-14 | Stop reason: SDUPTHER

## 2021-09-14 ENCOUNTER — OFFICE VISIT (OUTPATIENT)
Dept: CARDIOLOGY | Facility: CLINIC | Age: 56
End: 2021-09-14

## 2021-09-14 VITALS
HEART RATE: 67 BPM | WEIGHT: 242 LBS | BODY MASS INDEX: 32.78 KG/M2 | TEMPERATURE: 97.5 F | DIASTOLIC BLOOD PRESSURE: 74 MMHG | SYSTOLIC BLOOD PRESSURE: 122 MMHG | HEIGHT: 72 IN

## 2021-09-14 DIAGNOSIS — R06.02 SHORTNESS OF BREATH: ICD-10-CM

## 2021-09-14 DIAGNOSIS — I10 ESSENTIAL HYPERTENSION: ICD-10-CM

## 2021-09-14 DIAGNOSIS — E78.1 HYPERTRIGLYCERIDEMIA: Primary | ICD-10-CM

## 2021-09-14 DIAGNOSIS — E11.9 TYPE 2 DIABETES MELLITUS WITHOUT COMPLICATION, WITHOUT LONG-TERM CURRENT USE OF INSULIN (HCC): ICD-10-CM

## 2021-09-14 PROCEDURE — 93000 ELECTROCARDIOGRAM COMPLETE: CPT | Performed by: NURSE PRACTITIONER

## 2021-09-14 PROCEDURE — 99213 OFFICE O/P EST LOW 20 MIN: CPT | Performed by: NURSE PRACTITIONER

## 2021-09-14 RX ORDER — LOSARTAN POTASSIUM 50 MG/1
50 TABLET ORAL DAILY
Qty: 90 TABLET | Refills: 3 | Status: SHIPPED | OUTPATIENT
Start: 2021-09-14

## 2021-09-14 RX ORDER — HYDROCHLOROTHIAZIDE 25 MG/1
25 TABLET ORAL DAILY
Qty: 90 TABLET | Refills: 3 | Status: SHIPPED | OUTPATIENT
Start: 2021-09-14 | End: 2022-10-10

## 2021-09-14 NOTE — PROGRESS NOTES
Chief Complaint   Patient presents with   • Follow-up     cardiac management     Subjective       Brian Winters is a 55 y.o. male with HTN, diabetes, hyperlipidemia, and strong family history of stroke and heart disease.  Cardiac work-up in 2009 was negative.  We did not see him for a few years until recently when he returned for evaluation of increasing MCMULLEN and chest discomfort.  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 At consult, HCTZ 25 mg add for elevated bp also the SOB. Cardiac work up was ordered but not completed secondary to out-of-pocket cost >$2500. Overnight pulse ox was negative.  Labs 12/31/20: A1c increased from 7.1 to 8.4%, B12 356, TSH 2.2, CBC, vitamin D 26, testosterone 232, , HDL 40, , , GFR 90.  He was started on Rybelsus.    He returns today for follow-up.  Denies cardiac symptoms.  No inappropriate shortness of breath.  No chest pain.  He remains active, working full-time, caring for her aging parents.  He recently had his first grandchild.  He is watching his diet and has lost 9 pounds.  Labs done last month showed A1c improved back to around 7%.  Covid antibodies present from previous infection in October 2020.  He stopped taking Rybelsus secondary to GI intolerance.         Cardiac History:    Past Surgical History:   Procedure Laterality Date   • CARDIOVASCULAR STRESS TEST  07/14/2009    9 Min. 10.2 METS. 86% THR. BP- 184/92. EF 64%. Negative.   • ECHO - CONVERTED  05/12/2009    EF > 60%. Mild MR. RVSP- 36 mmHg.     Current Outpatient Medications   Medication Sig Dispense Refill   • aspirin 81 MG EC tablet Take 81 mg by mouth Daily.     • Cholecalciferol (VITAMIN D PO) Take  by mouth Daily.     • CINNAMON PO Take  by mouth Daily.     • Coenzyme Q10 (COQ-10 PO) Take  by mouth Daily.     • esomeprazole (nexIUM) 40 MG capsule Take 40 mg by mouth Every Morning Before Breakfast.     • fenofibrate (TRICOR) 145 MG tablet Take 145 mg by mouth  Daily.     • hydroCHLOROthiazide (HYDRODIURIL) 25 MG tablet Take 1 tablet by mouth Daily. 90 tablet 3   • losartan (COZAAR) 50 MG tablet Take 1 tablet by mouth Daily. 90 tablet 3   • metFORMIN (GLUCOPHAGE) 500 MG tablet Take 1,000 mg by mouth 2 (Two) Times a Day With Meals.     • Omega-3 Fatty Acids (FISH OIL) 1000 MG capsule capsule Take 1,000 mg by mouth Daily With Breakfast.       No current facility-administered medications for this visit.     Lisinopril    Past Medical History:   Diagnosis Date   • Diabetes mellitus (CMS/HCC)    • History of vasectomy    • Hyperlipidemia    • Hypertension    • Palpitations    • Paresthesia of skin    • Testicular hypofunction      Social History     Socioeconomic History   • Marital status:      Spouse name: Not on file   • Number of children: Not on file   • Years of education: Not on file   • Highest education level: Not on file   Tobacco Use   • Smoking status: Never Smoker   • Smokeless tobacco: Never Used   Substance and Sexual Activity   • Alcohol use: Yes     Comment: Occasional   • Drug use: No     Family History   Problem Relation Age of Onset   • Transient ischemic attack Mother    • Hypertension Mother    • Diabetes Mother    • Stroke Father    • Transient ischemic attack Father    • Hypertension Father    • Diabetes Brother    • Heart attack Maternal Uncle    • Diabetes Paternal Grandmother    • Diabetes Paternal Grandfather      Review of Systems   Constitutional: Positive for weight loss (Down 9 pounds). Negative for decreased appetite and malaise/fatigue.   HENT: Negative.    Eyes: Negative for blurred vision.   Cardiovascular: Negative for chest pain, dyspnea on exertion, leg swelling, palpitations and syncope.   Respiratory: Negative for shortness of breath and sleep disturbances due to breathing.    Endocrine: Negative.    Hematologic/Lymphatic: Negative for bleeding problem. Does not bruise/bleed easily.   Skin: Negative.    Musculoskeletal: Negative  "for falls and myalgias.   Gastrointestinal: Negative for abdominal pain, heartburn and melena.   Genitourinary: Negative for hematuria.   Neurological: Negative for dizziness and light-headedness.   Psychiatric/Behavioral: Negative for altered mental status.   Allergic/Immunologic: Negative.       Objective     /74 (BP Location: Right arm, Patient Position: Sitting, Cuff Size: Adult)   Pulse 67   Temp 97.5 °F (36.4 °C)   Ht 182.9 cm (72\")   Wt 110 kg (242 lb)   BMI 32.82 kg/m²     Vitals and nursing note reviewed.   Constitutional:       General: Not in acute distress.     Appearance: Well-developed. Not diaphoretic.   Eyes:      Pupils: Pupils are equal, round, and reactive to light.   HENT:      Head: Normocephalic.   Pulmonary:      Effort: Pulmonary effort is normal. No respiratory distress.      Breath sounds: Normal breath sounds.   Cardiovascular:      Normal rate. Regular rhythm.   Pulses:     Intact distal pulses.   Edema:     Peripheral edema absent.   Abdominal:      General: Bowel sounds are normal.      Palpations: Abdomen is soft.   Musculoskeletal: Normal range of motion.      Cervical back: Normal range of motion. Skin:     General: Skin is warm and dry.   Neurological:      Mental Status: Alert and oriented to person, place, and time.          ECG 12 Lead    Date/Time: 9/14/2021 11:51 AM  Performed by: Elvie Hoffman APRN  Authorized by: Elvie Hoffman APRN   Comparison: compared with previous ECG from 6/3/2019  Similar to previous ECG  Rhythm: sinus bradycardia  Rate: normal  BPM: 58  ST Segments: ST segments normal    Clinical impression: non-specific ECG  Comments:  ms   ms  QTc 397 ms                  Problem List Items Addressed This Visit        Cardiac and Vasculature    Hypertriglyceridemia - Primary    Essential hypertension    Relevant Medications    losartan (COZAAR) 50 MG tablet    hydroCHLOROthiazide (HYDRODIURIL) 25 MG tablet       Endocrine and Metabolic    Type " 2 diabetes mellitus without complication, without long-term current use of insulin (CMS/[facility])       Pulmonary and Pneumonias    Shortness of breath    Relevant Medications    hydroCHLOROthiazide (HYDRODIURIL) 25 MG tablet         1.  HTN-well-controlled.  Continue Losartan, HCTZ.  Continue low-sodium diet and weight loss efforts.    2. Hyperlipidemia- with triglycerides 238.  He is not on statin.  Continue fenofibrate and strict diet.     3.  Diabetes-A1c reported as improved to 7% with recent weight loss.  Continue low sugar, low carbohydrate diet.  Encouraged intermittent fasting.     Could we get a copy of most recent labs?  He appears stable.  Refill sent for losartan and HCTZ.  Continue conservative management.  If he develops any symptoms, will repeat cardiac work-up.  At this time will defer due to out-of-pocket cost.  Follow-up in 6 months or sooner if needed.     Patient's Body mass index is 32.82 kg/m². indicating that he is obese (BMI >30).  Continue heart healthy diet, regular exercise and weight loss efforts.  He was congratulated on his progress.             Electronically signed by LEXII Ureña,  September 14, 2021 11:54 EDT

## 2022-10-06 DIAGNOSIS — R06.02 SHORTNESS OF BREATH: ICD-10-CM

## 2022-10-06 DIAGNOSIS — I10 ESSENTIAL HYPERTENSION: ICD-10-CM

## 2022-10-10 RX ORDER — HYDROCHLOROTHIAZIDE 25 MG/1
TABLET ORAL
Qty: 30 TABLET | Refills: 0 | Status: SHIPPED | OUTPATIENT
Start: 2022-10-10

## 2023-02-20 DIAGNOSIS — I10 ESSENTIAL HYPERTENSION: ICD-10-CM

## 2023-02-20 DIAGNOSIS — R06.02 SHORTNESS OF BREATH: ICD-10-CM

## 2023-02-20 RX ORDER — HYDROCHLOROTHIAZIDE 25 MG/1
TABLET ORAL
Qty: 30 TABLET | Refills: 0 | OUTPATIENT
Start: 2023-02-20

## 2023-09-07 ENCOUNTER — TELEPHONE (OUTPATIENT)
Dept: CARDIOLOGY | Facility: CLINIC | Age: 58
End: 2023-09-07
Payer: COMMERCIAL

## 2023-09-07 NOTE — TELEPHONE ENCOUNTER
Caller: Brian Winters A    Relationship to patient: Self    Best call back number: [phone]    Chief complaint: PT WAS ABLE TO GET HIS OUT OF POCKET EXPENSE FOR INSURANCE COVERED AND WOULD LIKE TO SEE ABOUT GETTING A STRESS TEST AND APPT SCHEDULED BACK UP WITH MARK.     Type of visit: FU, TESTING    Requested date: ASAP       Additional notes: HAS BEEN A FEW YEARS SINCE LAST SEEN, BUT WITHIN THE 3 YEAR ROSI

## 2023-11-08 ENCOUNTER — OFFICE VISIT (OUTPATIENT)
Dept: CARDIOLOGY | Facility: CLINIC | Age: 58
End: 2023-11-08
Payer: COMMERCIAL

## 2023-11-08 VITALS
HEART RATE: 66 BPM | DIASTOLIC BLOOD PRESSURE: 92 MMHG | SYSTOLIC BLOOD PRESSURE: 164 MMHG | BODY MASS INDEX: 31.69 KG/M2 | HEIGHT: 72 IN | WEIGHT: 234 LBS

## 2023-11-08 DIAGNOSIS — Z82.49 FAMILY HISTORY OF ISCHEMIC HEART DISEASE (IHD): ICD-10-CM

## 2023-11-08 DIAGNOSIS — E11.9 TYPE 2 DIABETES MELLITUS WITHOUT COMPLICATION, WITHOUT LONG-TERM CURRENT USE OF INSULIN: ICD-10-CM

## 2023-11-08 DIAGNOSIS — R06.02 SHORTNESS OF BREATH: ICD-10-CM

## 2023-11-08 DIAGNOSIS — E78.1 HYPERTRIGLYCERIDEMIA: ICD-10-CM

## 2023-11-08 DIAGNOSIS — E66.9 OBESITY (BMI 30-39.9): ICD-10-CM

## 2023-11-08 DIAGNOSIS — I25.6 SILENT MYOCARDIAL ISCHEMIA: Primary | ICD-10-CM

## 2023-11-08 DIAGNOSIS — I10 ESSENTIAL HYPERTENSION: ICD-10-CM

## 2023-11-08 RX ORDER — TIRZEPATIDE 5 MG/.5ML
5 INJECTION, SOLUTION SUBCUTANEOUS WEEKLY
COMMUNITY

## 2023-11-08 NOTE — PROGRESS NOTES
Chief Complaint   Patient presents with    Follow-up     Pt is here for cardiac follow up.  Pt denies CP, SOB, dizziness or palpitations.  Pt does not take a daily ASA.      Med Refill     Medications were verified by med list.      Lab Work     Pt states their last labs were 4/21/23- pt brought in copy.         Cardiac Complaints  SOA      Subjective   Brian LILI Winters is a 57 y.o. male with HTN, diabetes, hyperlipidemia, and strong family history of stroke and heart disease.  Cardiac work-up in 2009 was negative.  We did not see him for a few years until recently when he returned for evaluation of increasing MCMULLEN and chest discomfort.  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 At consult, HCTZ 25 mg add for elevated bp also the SOB. Cardiac work up was ordered but not completed secondary to out-of-pocket cost >$2500. Overnight pulse ox was negative.      He comes today for follow up and denies any new concerns. No CP, dizziness, palpitations, or syncope noted. SOA only noted with overexertion. Labs with PCP, checked 4/2023 showed: BUN 13, K 4.3, Na 142, , TRIG 316, HDL 38, LDL 80, TSH 2.890, AIC 6.5%. Meds with PCP.      Cardiac History  Past Surgical History:   Procedure Laterality Date    CARDIOVASCULAR STRESS TEST  07/14/2009    9 Min. 10.2 METS. 86% THR. BP- 184/92. EF 64%. Negative.    ECHO - CONVERTED  05/12/2009    EF > 60%. Mild MR. RVSP- 36 mmHg.       Current Outpatient Medications   Medication Sig Dispense Refill    Cholecalciferol (VITAMIN D PO) Take  by mouth Daily.      CINNAMON PO Take  by mouth Daily.      empagliflozin (Jardiance) 10 MG tablet tablet Take 1 tablet by mouth Daily.      Esomeprazole Magnesium 20 MG tablet delayed-release Take 20 mg by mouth Every Morning Before Breakfast.      hydroCHLOROthiazide (HYDRODIURIL) 25 MG tablet TAKE ONE TABLET BY MOUTH EVERY DAY 30 tablet 0    losartan (COZAAR) 50 MG tablet Take 1 tablet by mouth Daily. 90 tablet 3     metFORMIN (GLUCOPHAGE) 500 MG tablet Take 2 tablets by mouth 2 (Two) Times a Day With Meals.      Omega-3 Fatty Acids (FISH OIL) 1000 MG capsule capsule Take 1 capsule by mouth Daily With Breakfast.      Tirzepatide (Mounjaro) 5 MG/0.5ML solution pen-injector Inject 0.5 mL under the skin into the appropriate area as directed 1 (One) Time Per Week.      aspirin 81 MG EC tablet Take 1 tablet by mouth Daily.       No current facility-administered medications for this visit.       Lisinopril    Past Medical History:   Diagnosis Date    Diabetes mellitus     History of vasectomy     Hyperlipidemia     Hypertension     Palpitations     Paresthesia of skin     Testicular hypofunction        Social History     Socioeconomic History    Marital status:    Tobacco Use    Smoking status: Never    Smokeless tobacco: Never   Vaping Use    Vaping Use: Never used   Substance and Sexual Activity    Alcohol use: Yes     Alcohol/week: 1.0 standard drink of alcohol     Types: 1 Drinks containing 0.5 oz of alcohol per week     Comment: Occasional    Drug use: No    Sexual activity: Yes     Partners: Female       Family History   Problem Relation Age of Onset    Transient ischemic attack Mother     Hypertension Mother     Diabetes Mother     Stroke Father     Transient ischemic attack Father     Hypertension Father     Diabetes Brother     Heart attack Maternal Uncle     Diabetes Paternal Grandmother     Diabetes Paternal Grandfather        Review of Systems   Constitutional: Negative for malaise/fatigue and night sweats.   Cardiovascular:  Negative for chest pain, claudication, dyspnea on exertion, irregular heartbeat, leg swelling, near-syncope, orthopnea, palpitations and syncope.   Respiratory:  Positive for shortness of breath. Negative for cough and wheezing.    Musculoskeletal:  Negative for back pain, joint pain and stiffness.   Gastrointestinal:  Negative for anorexia, heartburn, nausea and vomiting.   Genitourinary:   "Negative for dysuria, hematuria, hesitancy and nocturia.   Neurological:  Negative for dizziness, headaches and light-headedness.   Psychiatric/Behavioral:  Negative for depression and memory loss. The patient is not nervous/anxious.            Objective     /92 (BP Location: Left arm, Patient Position: Sitting)   Pulse 66   Ht 182.9 cm (72\")   Wt 106 kg (234 lb)   BMI 31.74 kg/m²     Constitutional:       Appearance: Not in distress.   Eyes:      Pupils: Pupils are equal, round, and reactive to light.   HENT:      Nose: Nose normal.   Pulmonary:      Effort: Pulmonary effort is normal.      Breath sounds: Normal breath sounds.   Cardiovascular:      PMI at left midclavicular line. Normal rate. Regular rhythm.      Murmurs: There is a systolic murmur.   Abdominal:      Palpations: Abdomen is soft.   Musculoskeletal: Normal range of motion.      Cervical back: Normal range of motion and neck supple. Skin:     General: Skin is warm and dry.   Neurological:      Mental Status: Alert.         Procedures         Diagnoses and all orders for this visit:    1. Silent myocardial ischemia (Primary)  -     Stress Test With Myocardial Perfusion One Day; Future  -     Adult Transthoracic Echo Complete W/ Cont if Necessary Per Protocol; Future    2. Essential hypertension  -     Stress Test With Myocardial Perfusion One Day; Future  -     Adult Transthoracic Echo Complete W/ Cont if Necessary Per Protocol; Future    3. Shortness of breath  -     Stress Test With Myocardial Perfusion One Day; Future  -     Adult Transthoracic Echo Complete W/ Cont if Necessary Per Protocol; Future    4. Hypertriglyceridemia    5. Family history of ischemic heart disease (IHD)  -     Stress Test With Myocardial Perfusion One Day; Future  -     Adult Transthoracic Echo Complete W/ Cont if Necessary Per Protocol; Future    6. Type 2 diabetes mellitus without complication, without long-term current use of insulin  -     Stress Test With " Myocardial Perfusion One Day; Future  -     Adult Transthoracic Echo Complete W/ Cont if Necessary Per Protocol; Future    7. Obesity (BMI 30-39.9)             Cardiac status: SOA noted on exertion. He has not had a cardiac workup since 2009, now agreeable to proceed as he has met out of pocket costs. We will encourage patient to undergo DANIKA and ECHO. Continue with ASA. More recommendations to follow.    HTN: BP is elevated. He admits at home well controlled. He will continue with losartan and HCTZ at same. If elevation is noted, he will call for more recommendations. If elevation noted on stress, recommendations to follow.     DM: Well controlled on recent labs, AIC at goal. Limited carb diet. Will continue glucophage, jardiance, and mounjaro.     Hypertriglycerides: Noted, limited carb diet, continue use of fish oil and apple cider vinegar urged.     Refills per request    BMI noted at 31.74, good cardiac ADA diet with limited carb, calories, and walking regimen urged.    6 month follow up advised, or sooner if needed.          Problems Addressed this Visit          Cardiac and Vasculature    Hypertriglyceridemia    Essential hypertension    Relevant Orders    Stress Test With Myocardial Perfusion One Day    Adult Transthoracic Echo Complete W/ Cont if Necessary Per Protocol       Endocrine and Metabolic    Type 2 diabetes mellitus without complication, without long-term current use of insulin    Relevant Medications    empagliflozin (Jardiance) 10 MG tablet tablet    Tirzepatide (Mounjaro) 5 MG/0.5ML solution pen-injector    Other Relevant Orders    Stress Test With Myocardial Perfusion One Day    Adult Transthoracic Echo Complete W/ Cont if Necessary Per Protocol       Pulmonary and Pneumonias    Shortness of breath    Relevant Orders    Stress Test With Myocardial Perfusion One Day    Adult Transthoracic Echo Complete W/ Cont if Necessary Per Protocol     Other Visit Diagnoses       Silent myocardial ischemia     -  Primary    Relevant Orders    Stress Test With Myocardial Perfusion One Day    Adult Transthoracic Echo Complete W/ Cont if Necessary Per Protocol    Family history of ischemic heart disease (IHD)        Relevant Orders    Stress Test With Myocardial Perfusion One Day    Adult Transthoracic Echo Complete W/ Cont if Necessary Per Protocol    Obesity (BMI 30-39.9)              Diagnoses         Codes Comments    Silent myocardial ischemia    -  Primary ICD-10-CM: I25.6  ICD-9-CM: 414.8     Essential hypertension     ICD-10-CM: I10  ICD-9-CM: 401.9     Shortness of breath     ICD-10-CM: R06.02  ICD-9-CM: 786.05     Hypertriglyceridemia     ICD-10-CM: E78.1  ICD-9-CM: 272.1     Family history of ischemic heart disease (IHD)     ICD-10-CM: Z82.49  ICD-9-CM: V17.3     Type 2 diabetes mellitus without complication, without long-term current use of insulin     ICD-10-CM: E11.9  ICD-9-CM: 250.00     Obesity (BMI 30-39.9)     ICD-10-CM: E66.9  ICD-9-CM: 278.00                           Electronically signed by Christiana Elizabeth, APRN November 9, 2023 16:40 EST

## 2023-11-22 ENCOUNTER — HOSPITAL ENCOUNTER (OUTPATIENT)
Dept: CARDIOLOGY | Facility: HOSPITAL | Age: 58
Discharge: HOME | End: 2023-11-22
Payer: COMMERCIAL

## 2023-11-22 VITALS — BODY MASS INDEX: 31.65 KG/M2 | WEIGHT: 233.69 LBS | HEIGHT: 72 IN

## 2023-11-22 DIAGNOSIS — Z82.49 FAMILY HISTORY OF ISCHEMIC HEART DISEASE (IHD): ICD-10-CM

## 2023-11-22 DIAGNOSIS — I10 ESSENTIAL HYPERTENSION: ICD-10-CM

## 2023-11-22 DIAGNOSIS — E11.9 TYPE 2 DIABETES MELLITUS WITHOUT COMPLICATION, WITHOUT LONG-TERM CURRENT USE OF INSULIN: ICD-10-CM

## 2023-11-22 DIAGNOSIS — R06.02 SHORTNESS OF BREATH: ICD-10-CM

## 2023-11-22 DIAGNOSIS — I25.6 SILENT MYOCARDIAL ISCHEMIA: ICD-10-CM

## 2023-11-22 LAB
AORTIC DIMENSIONLESS INDEX: 0.73 (DI)
BH CV ECHO MEAS - ACS: 1.86 CM
BH CV ECHO MEAS - AO MAX PG: 8.3 MMHG
BH CV ECHO MEAS - AO MEAN PG: 4.1 MMHG
BH CV ECHO MEAS - AO ROOT DIAM: 3.1 CM
BH CV ECHO MEAS - AO V2 MAX: 143.9 CM/SEC
BH CV ECHO MEAS - AO V2 VTI: 30.8 CM
BH CV ECHO MEAS - EDV(CUBED): 93.9 ML
BH CV ECHO MEAS - EF(MOD-BP): 68 %
BH CV ECHO MEAS - ESV(CUBED): 22.2 ML
BH CV ECHO MEAS - FS: 38.2 %
BH CV ECHO MEAS - IVS/LVPW: 0.81 CM
BH CV ECHO MEAS - IVSD: 0.94 CM
BH CV ECHO MEAS - LA DIMENSION: 3.7 CM
BH CV ECHO MEAS - LAT PEAK E' VEL: 6.2 CM/SEC
BH CV ECHO MEAS - LV MASS(C)D: 167.3 GRAMS
BH CV ECHO MEAS - LV MAX PG: 4.4 MMHG
BH CV ECHO MEAS - LV MEAN PG: 1.87 MMHG
BH CV ECHO MEAS - LV V1 MAX: 104.3 CM/SEC
BH CV ECHO MEAS - LV V1 VTI: 24 CM
BH CV ECHO MEAS - LVIDD: 4.5 CM
BH CV ECHO MEAS - LVIDS: 2.8 CM
BH CV ECHO MEAS - LVPWD: 1.16 CM
BH CV ECHO MEAS - MED PEAK E' VEL: 5.7 CM/SEC
BH CV ECHO MEAS - MV A MAX VEL: 59.8 CM/SEC
BH CV ECHO MEAS - MV DEC SLOPE: 365.9 CM/SEC2
BH CV ECHO MEAS - MV DEC TIME: 0.25 SEC
BH CV ECHO MEAS - MV E MAX VEL: 54.6 CM/SEC
BH CV ECHO MEAS - MV E/A: 0.91
BH CV ECHO MEAS - MV MAX PG: 2.16 MMHG
BH CV ECHO MEAS - MV MEAN PG: 0.88 MMHG
BH CV ECHO MEAS - MV P1/2T: 56.4 MSEC
BH CV ECHO MEAS - MV V2 VTI: 26.6 CM
BH CV ECHO MEAS - MVA(P1/2T): 3.9 CM2
BH CV ECHO MEAS - PA V2 MAX: 83.1 CM/SEC
BH CV ECHO MEAS - RAP SYSTOLE: 8 MMHG
BH CV ECHO MEAS - RV MAX PG: 1.88 MMHG
BH CV ECHO MEAS - RV V1 MAX: 68.6 CM/SEC
BH CV ECHO MEAS - RV V1 VTI: 14.3 CM
BH CV ECHO MEAS - RVDD: 3.5 CM
BH CV ECHO MEAS - RVSP: 11.4 MMHG
BH CV ECHO MEAS - TAPSE (>1.6): 2.6 CM
BH CV ECHO MEAS - TR MAX PG: 3.4 MMHG
BH CV ECHO MEAS - TR MAX VEL: 92.7 CM/SEC
BH CV ECHO MEASUREMENTS AVERAGE E/E' RATIO: 9.18
BH CV REST NUCLEAR ISOTOPE DOSE: 10 MCI
BH CV STRESS NUCLEAR ISOTOPE DOSE: 30 MCI
BH CV STRESS RECOVERY BP: NORMAL MMHG
BH CV STRESS RECOVERY HR: 82 BPM
LV EF NUC BP: 56 %
MAXIMAL PREDICTED HEART RATE: 161 BPM
PERCENT MAX PREDICTED HR: 81.37 %
SINUS: 3 CM
STRESS BASELINE BP: NORMAL MMHG
STRESS BASELINE HR: 61 BPM
STRESS PERCENT HR: 96 %
STRESS POST ESTIMATED WORKLOAD: 7 METS
STRESS POST EXERCISE DUR MIN: 7 MIN
STRESS POST PEAK BP: NORMAL MMHG
STRESS POST PEAK HR: 131 BPM
STRESS TARGET HR: 137 BPM

## 2023-11-22 PROCEDURE — 78452 HT MUSCLE IMAGE SPECT MULT: CPT

## 2023-11-22 PROCEDURE — A9500 TC99M SESTAMIBI: HCPCS | Performed by: INTERNAL MEDICINE

## 2023-11-22 PROCEDURE — 93306 TTE W/DOPPLER COMPLETE: CPT

## 2023-11-22 PROCEDURE — 0 TECHNETIUM SESTAMIBI: Performed by: INTERNAL MEDICINE

## 2023-11-22 PROCEDURE — 93017 CV STRESS TEST TRACING ONLY: CPT

## 2023-11-22 RX ORDER — LOSARTAN POTASSIUM 50 MG/1
50 TABLET ORAL 2 TIMES DAILY
Qty: 180 TABLET | Refills: 3 | Status: SHIPPED | OUTPATIENT
Start: 2023-11-22

## 2023-11-22 RX ADMIN — TECHNETIUM TC 99M SESTAMIBI 1 DOSE: 1 INJECTION INTRAVENOUS at 10:47

## 2023-11-22 RX ADMIN — TECHNETIUM TC 99M SESTAMIBI 1 DOSE: 1 INJECTION INTRAVENOUS at 08:43

## 2023-11-22 NOTE — TELEPHONE ENCOUNTER
----- Message from LEXII Woodall sent at 11/22/2023  3:30 PM EST -----  Apical infarct with EF of 565, BP very elevated on stress. Please increase cozaar to 50mg BID

## 2023-11-27 ENCOUNTER — TELEPHONE (OUTPATIENT)
Dept: CARDIOLOGY | Facility: CLINIC | Age: 58
End: 2023-11-27
Payer: COMMERCIAL

## 2024-05-15 ENCOUNTER — OFFICE VISIT (OUTPATIENT)
Dept: CARDIOLOGY | Facility: CLINIC | Age: 59
End: 2024-05-15
Payer: COMMERCIAL

## 2024-05-15 VITALS
HEIGHT: 71 IN | HEART RATE: 72 BPM | SYSTOLIC BLOOD PRESSURE: 130 MMHG | WEIGHT: 228.2 LBS | BODY MASS INDEX: 31.95 KG/M2 | DIASTOLIC BLOOD PRESSURE: 80 MMHG

## 2024-05-15 DIAGNOSIS — E66.9 OBESITY (BMI 30.0-34.9): ICD-10-CM

## 2024-05-15 DIAGNOSIS — E78.2 MIXED HYPERLIPIDEMIA: ICD-10-CM

## 2024-05-15 DIAGNOSIS — I10 ESSENTIAL HYPERTENSION: Primary | ICD-10-CM

## 2024-05-15 DIAGNOSIS — R06.02 SHORTNESS OF BREATH: ICD-10-CM

## 2024-05-15 DIAGNOSIS — E11.9 TYPE 2 DIABETES MELLITUS WITHOUT COMPLICATION, WITHOUT LONG-TERM CURRENT USE OF INSULIN: ICD-10-CM

## 2024-05-15 RX ORDER — ROSUVASTATIN CALCIUM 10 MG/1
10 TABLET, COATED ORAL DAILY
Qty: 90 TABLET | Refills: 2 | Status: SHIPPED | OUTPATIENT
Start: 2024-05-15

## 2024-05-15 RX ORDER — LOSARTAN POTASSIUM 50 MG/1
50 TABLET ORAL 2 TIMES DAILY
Qty: 180 TABLET | Refills: 3 | Status: SHIPPED | OUTPATIENT
Start: 2024-05-15

## 2024-05-15 RX ORDER — ROSUVASTATIN CALCIUM 10 MG/1
10 TABLET, COATED ORAL DAILY
COMMUNITY
End: 2024-05-15 | Stop reason: SDUPTHER

## 2024-05-15 RX ORDER — HYDROCHLOROTHIAZIDE 25 MG/1
25 TABLET ORAL DAILY
Qty: 30 TABLET | Refills: 0 | Status: SHIPPED | OUTPATIENT
Start: 2024-05-15

## 2024-05-15 NOTE — PROGRESS NOTES
Chief Complaint   Patient presents with    Follow-up     Pt is here for cardiac follow up.  Pt denies CP, SOB, dizziness or palpitations.  Pt does take a daily ASA.      Med Refill     Pt request 90 day refills to be sent to [facility].  Medications were verified by the pt      Lab Work     Pt states their last labs were about 3 weeks ago with his PCP.         Cardiac Complaints  none      Subjective   Brian LILI Winters is a 58 y.o. male with HTN, diabetes, hyperlipidemia, and strong family history of stroke and heart disease.  Cardiac work-up in 2009 was negative.  We did not see him for a few years until recently when he returned for evaluation of increasing MCMULLEN and chest discomfort.  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 At consult, HCTZ 25 mg add for elevated bp also the SOB. Cardiac work up was ordered but not completed secondary to out-of-pocket cost >$2500. Overnight pulse ox was negative.  Repeat workup 2023 showed infarct without ischemia, HTN response, increase in cozaar advised.     He comes today for follow up and denies any new concerns. No CP, SOA, palpitations, dizziness, or syncope noted. Labs 3 weeks ago with PCP. Refills requested, no current available.         Cardiac History  Past Surgical History:   Procedure Laterality Date    CARDIOVASCULAR STRESS TEST  07/14/2009    9 Min. 10.2 METS. 86% THR. BP- 184/92. EF 64%. Negative.    CARDIOVASCULAR STRESS TEST  11/22/2023    7.0 Min. 7.0 METS. 81% THR. 199/71. EF 56%. Apical Infarct    ECHO - CONVERTED  05/12/2009    EF > 60%. Mild MR. RVSP- 36 mmHg.    ECHO - CONVERTED  11/22/2023    EF 60%. Trace-Mild MR. RVSP- 12 mmHg       Current Outpatient Medications   Medication Sig Dispense Refill    aspirin 81 MG EC tablet Take 1 tablet by mouth Daily.      Cholecalciferol (VITAMIN D PO) Take  by mouth Daily.      CINNAMON PO Take  by mouth Daily.      empagliflozin (Jardiance) 10 MG tablet tablet Take 1 tablet by mouth Daily.       Esomeprazole Magnesium 20 MG tablet delayed-release Take 20 mg by mouth Every Morning Before Breakfast.      hydroCHLOROthiazide 25 MG tablet Take 1 tablet by mouth Daily. 30 tablet 0    losartan (COZAAR) 50 MG tablet Take 1 tablet by mouth 2 (Two) Times a Day. 180 tablet 3    metFORMIN (GLUCOPHAGE) 500 MG tablet Take 2 tablets by mouth 2 (Two) Times a Day With Meals.      Omega-3 Fatty Acids (FISH OIL) 1000 MG capsule capsule Take 1 capsule by mouth Daily With Breakfast.      rosuvastatin (CRESTOR) 10 MG tablet Take 1 tablet by mouth Daily. 90 tablet 2    Tirzepatide (MOUNJARO) 7.5 MG/0.5ML solution pen-injector pen Inject 0.5 mL under the skin into the appropriate area as directed 1 (One) Time Per Week.       No current facility-administered medications for this visit.       Lisinopril    Past Medical History:   Diagnosis Date    Diabetes mellitus     History of vasectomy     Hyperlipidemia     Hypertension     Palpitations     Paresthesia of skin     Testicular hypofunction        Social History     Socioeconomic History    Marital status:    Tobacco Use    Smoking status: Never    Smokeless tobacco: Never   Vaping Use    Vaping status: Never Used   Substance and Sexual Activity    Alcohol use: Yes     Alcohol/week: 1.0 standard drink of alcohol     Types: 1 Drinks containing 0.5 oz of alcohol per week     Comment: Occasional    Drug use: No    Sexual activity: Yes     Partners: Female       Family History   Problem Relation Age of Onset    Transient ischemic attack Mother     Hypertension Mother     Diabetes Mother     Stroke Father     Transient ischemic attack Father     Hypertension Father     Diabetes Brother     Heart attack Maternal Uncle     Diabetes Paternal Grandmother     Diabetes Paternal Grandfather        Review of Systems   Constitutional: Negative for malaise/fatigue and night sweats.   Cardiovascular:  Negative for chest pain, claudication, dyspnea on exertion, irregular heartbeat,  "leg swelling, near-syncope, orthopnea, palpitations and syncope.   Respiratory:  Negative for cough, shortness of breath and wheezing.    Musculoskeletal:  Negative for back pain, joint pain and stiffness.   Gastrointestinal:  Negative for anorexia, heartburn, nausea and vomiting.   Genitourinary:  Negative for dysuria, hematuria, hesitancy and nocturia.   Neurological:  Negative for dizziness, light-headedness and loss of balance.   Psychiatric/Behavioral:  Negative for depression and memory loss. The patient is not nervous/anxious.            Objective     /80 (BP Location: Left arm, Patient Position: Sitting)   Pulse 72   Ht 180.3 cm (71\")   Wt 104 kg (228 lb 3.2 oz)   BMI 31.83 kg/m²     Constitutional:       Appearance: Not in distress.   Eyes:      Pupils: Pupils are equal, round, and reactive to light.   HENT:      Nose: Nose normal.   Pulmonary:      Effort: Pulmonary effort is normal.      Breath sounds: Normal breath sounds.   Cardiovascular:      PMI at left midclavicular line. Normal rate. Regular rhythm.      Murmurs: There is a systolic murmur.   Abdominal:      Palpations: Abdomen is soft.   Musculoskeletal: Normal range of motion.      Cervical back: Normal range of motion and neck supple. Skin:     General: Skin is warm and dry.   Neurological:      Mental Status: Alert.         Procedures         Diagnoses and all orders for this visit:    1. Essential hypertension (Primary)  -     hydroCHLOROthiazide 25 MG tablet; Take 1 tablet by mouth Daily.  Dispense: 30 tablet; Refill: 0    2. Mixed hyperlipidemia    3. Shortness of breath  -     hydroCHLOROthiazide 25 MG tablet; Take 1 tablet by mouth Daily.  Dispense: 30 tablet; Refill: 0    4. Type 2 diabetes mellitus without complication, without long-term current use of insulin    5. Obesity (BMI 30.0-34.9)    Other orders  -     losartan (COZAAR) 50 MG tablet; Take 1 tablet by mouth 2 (Two) Times a Day.  Dispense: 180 tablet; Refill: 3  -     " rosuvastatin (CRESTOR) 10 MG tablet; Take 1 tablet by mouth Daily.  Dispense: 90 tablet; Refill: 2             Cardiac status: Stable, most recent workup showed no ischemia, good LV function, possible apical infarct. ASA Continued, no repeat workup urged.     HTN: BP is stable. Will continue losartan 50mg BID as well as HCTZ at same. Limited sodium urged.      DM: Limited carb diet. Will continue glucophage, jardiance, and mounjaro. Followed by your office.    Mixed hyperlipidemia: On crestor and fish oil therapy. Will continue same. FLP with your office. Limited carb diet urged.     Refills per request     BMI noted at 31.83, good cardiac ADA diet with limited carb, calories, and walking regimen urged.     6 month follow up advised, or sooner if needed.            Problems Addressed this Visit          Cardiac and Vasculature    Essential hypertension - Primary    Relevant Medications    hydroCHLOROthiazide 25 MG tablet    losartan (COZAAR) 50 MG tablet       Endocrine and Metabolic    Type 2 diabetes mellitus without complication, without long-term current use of insulin       Pulmonary and Pneumonias    Shortness of breath    Relevant Medications    hydroCHLOROthiazide 25 MG tablet     Other Visit Diagnoses       Mixed hyperlipidemia        Relevant Medications    rosuvastatin (CRESTOR) 10 MG tablet    Obesity (BMI 30.0-34.9)              Diagnoses         Codes Comments    Essential hypertension    -  Primary ICD-10-CM: I10  ICD-9-CM: 401.9     Mixed hyperlipidemia     ICD-10-CM: E78.2  ICD-9-CM: 272.2     Shortness of breath     ICD-10-CM: R06.02  ICD-9-CM: 786.05     Type 2 diabetes mellitus without complication, without long-term current use of insulin     ICD-10-CM: E11.9  ICD-9-CM: 250.00     Obesity (BMI 30.0-34.9)     ICD-10-CM: E66.9  ICD-9-CM: 278.00                     Electronically signed by Christiana Elizabeth, LEXII May 15, 2024 09:33 EDT

## 2024-08-13 DIAGNOSIS — R06.02 SHORTNESS OF BREATH: ICD-10-CM

## 2024-08-13 DIAGNOSIS — I10 ESSENTIAL HYPERTENSION: ICD-10-CM

## 2024-08-15 RX ORDER — HYDROCHLOROTHIAZIDE 25 MG/1
25 TABLET ORAL DAILY
Qty: 90 TABLET | Refills: 0 | Status: SHIPPED | OUTPATIENT
Start: 2024-08-15

## 2024-11-12 DIAGNOSIS — I10 ESSENTIAL HYPERTENSION: ICD-10-CM

## 2024-11-12 DIAGNOSIS — R06.02 SHORTNESS OF BREATH: ICD-10-CM

## 2024-11-12 RX ORDER — HYDROCHLOROTHIAZIDE 25 MG/1
25 TABLET ORAL DAILY
Qty: 30 TABLET | Refills: 0 | Status: SHIPPED | OUTPATIENT
Start: 2024-11-12

## 2024-11-12 NOTE — TELEPHONE ENCOUNTER
Rx Refill Note  Requested Prescriptions     Pending Prescriptions Disp Refills    hydroCHLOROthiazide 25 MG tablet [Pharmacy Med Name: HYDROCHLOROTHIAZIDE 25 MG T 25 Tablet] 30 tablet 0     Sig: TAKE ONE TABLET BY MOUTH EVERY DAY      Last office visit with prescribing clinician: 5/15/2024   Last telemedicine visit with prescribing clinician: Visit date not found   Next office visit with prescribing clinician: 11/20/2024                         Would you like a call back once the refill request has been completed: [] Yes [] No    If the office needs to give you a call back, can they leave a voicemail: [] Yes [] No    Laurel Jaimes CMA  11/12/24, 16:21 EST

## 2024-11-20 ENCOUNTER — OFFICE VISIT (OUTPATIENT)
Dept: CARDIOLOGY | Facility: CLINIC | Age: 59
End: 2024-11-20
Payer: COMMERCIAL

## 2024-11-20 VITALS
HEART RATE: 68 BPM | WEIGHT: 225 LBS | HEIGHT: 71 IN | SYSTOLIC BLOOD PRESSURE: 134 MMHG | RESPIRATION RATE: 18 BRPM | BODY MASS INDEX: 31.5 KG/M2 | DIASTOLIC BLOOD PRESSURE: 80 MMHG

## 2024-11-20 DIAGNOSIS — E11.9 TYPE 2 DIABETES MELLITUS WITHOUT COMPLICATION, WITHOUT LONG-TERM CURRENT USE OF INSULIN: ICD-10-CM

## 2024-11-20 DIAGNOSIS — I10 ESSENTIAL HYPERTENSION: Primary | ICD-10-CM

## 2024-11-20 DIAGNOSIS — E78.2 MIXED HYPERLIPIDEMIA: ICD-10-CM

## 2024-11-20 DIAGNOSIS — E66.811 OBESITY (BMI 30.0-34.9): ICD-10-CM

## 2024-11-20 RX ORDER — LOSARTAN POTASSIUM 50 MG/1
50 TABLET ORAL 2 TIMES DAILY
Qty: 180 TABLET | Refills: 3 | Status: SHIPPED | OUTPATIENT
Start: 2024-11-20

## 2024-11-20 NOTE — PROGRESS NOTES
Chief Complaint   Patient presents with    Follow-up     Pt is here for cardiac follow up.   Pt has not had any cardiac issues since last visit no ER or hospital visit. Recent labs done 2 month per pcp. Pt called pcp to get recent labs faxed to our office    Hypertension     BP has been doing good at home.    Med Refill     30 day refills on cardiac meds to , med list brought to appointment       Cardiac Complaints  none      Subjective   Brian LILI Winters is a 59 y.o. male with HTN, diabetes, hyperlipidemia, and strong family history of stroke and heart disease.  Cardiac work-up in 2009 was negative.  We did not see him for a few years until recently when he returned for evaluation of increasing MCMULLEN and chest discomfort.  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 At consult, HCTZ 25 mg add for elevated bp also the SOB. Cardiac work up was ordered but not completed secondary to out-of-pocket cost >$2500. Overnight pulse ox was negative.  Repeat workup 2023 showed infarct without ischemia, HTN response, increase in cozaar advised.     He returns today for follow up and denies new concerns. No CP, SOA, palpitations, dizziness, or syncope noted. Labs with PCP, none available, patient called to have sent to the office. Refills requested.        Cardiac History  Past Surgical History:   Procedure Laterality Date    CARDIOVASCULAR STRESS TEST  07/14/2009    9 Min. 10.2 METS. 86% THR. BP- 184/92. EF 64%. Negative.    CARDIOVASCULAR STRESS TEST  11/22/2023    7.0 Min. 7.0 METS. 81% THR. 199/71. EF 56%. Apical Infarct    ECHO - CONVERTED  05/12/2009    EF > 60%. Mild MR. RVSP- 36 mmHg.    ECHO - CONVERTED  11/22/2023    EF 60%. Trace-Mild MR. RVSP- 12 mmHg       Current Outpatient Medications   Medication Sig Dispense Refill    aspirin 81 MG EC tablet Take 1 tablet by mouth Daily.      Cholecalciferol (VITAMIN D PO) Take  by mouth Daily.      CINNAMON PO Take  by mouth Daily.       empagliflozin (Jardiance) 10 MG tablet tablet Take 1 tablet by mouth Daily.      Esomeprazole Magnesium 20 MG tablet delayed-release Take 20 mg by mouth Every Morning Before Breakfast.      losartan (COZAAR) 50 MG tablet Take 1 tablet by mouth 2 (Two) Times a Day. 180 tablet 3    metFORMIN (GLUCOPHAGE) 500 MG tablet Take 2 tablets by mouth 2 (Two) Times a Day With Meals.      Omega-3 Fatty Acids (FISH OIL) 1000 MG capsule capsule Take 1 capsule by mouth Daily With Breakfast.      rosuvastatin (CRESTOR) 10 MG tablet Take 1 tablet by mouth Daily. 90 tablet 2    Tirzepatide (MOUNJARO) 7.5 MG/0.5ML solution pen-injector pen Inject 0.5 mL under the skin into the appropriate area as directed 1 (One) Time Per Week.       No current facility-administered medications for this visit.       Lisinopril    Past Medical History:   Diagnosis Date    Diabetes mellitus     History of vasectomy     Hyperlipidemia     Hypertension     Palpitations     Paresthesia of skin     Testicular hypofunction        Social History     Socioeconomic History    Marital status:    Tobacco Use    Smoking status: Never    Smokeless tobacco: Never   Vaping Use    Vaping status: Never Used   Substance and Sexual Activity    Alcohol use: Yes     Alcohol/week: 1.0 standard drink of alcohol     Types: 1 Drinks containing 0.5 oz of alcohol per week     Comment: Occasional    Drug use: No    Sexual activity: Yes     Partners: Female       Family History   Problem Relation Age of Onset    Transient ischemic attack Mother     Hypertension Mother     Diabetes Mother     Stroke Father     Transient ischemic attack Father     Hypertension Father     Diabetes Brother     Heart attack Maternal Uncle     Diabetes Paternal Grandmother     Diabetes Paternal Grandfather        Review of Systems   Constitutional: Negative for malaise/fatigue and night sweats.   Cardiovascular:  Negative for chest pain, dyspnea on exertion, leg swelling, near-syncope, orthopnea,  "palpitations and syncope.   Respiratory:  Negative for cough, shortness of breath and wheezing.    Musculoskeletal:  Negative for back pain, joint pain and stiffness.   Gastrointestinal:  Negative for anorexia, heartburn, nausea and vomiting.   Genitourinary:  Negative for dysuria, hematuria, hesitancy and nocturia.   Neurological:  Negative for dizziness, headaches and light-headedness.   Psychiatric/Behavioral:  Negative for depression and memory loss. The patient is not nervous/anxious.            Objective     /80 (BP Location: Left arm, Patient Position: Sitting, Cuff Size: Adult)   Pulse 68   Resp 18   Ht 180.3 cm (71\")   Wt 102 kg (225 lb)   BMI 31.38 kg/m²     Constitutional:       Appearance: Not in distress.   Eyes:      Pupils: Pupils are equal, round, and reactive to light.   HENT:      Nose: Nose normal.   Pulmonary:      Effort: Pulmonary effort is normal.      Breath sounds: Normal breath sounds.   Cardiovascular:      PMI at left midclavicular line. Normal rate. Regular rhythm.      Murmurs: There is a systolic murmur.   Abdominal:      Palpations: Abdomen is soft.   Musculoskeletal: Normal range of motion.      Cervical back: Normal range of motion and neck supple. Skin:     General: Skin is warm and dry.   Neurological:      Mental Status: Alert.         Procedures         Diagnoses and all orders for this visit:    1. Essential hypertension (Primary)    2. Mixed hyperlipidemia    3. Type 2 diabetes mellitus without complication, without long-term current use of insulin    4. Obesity (BMI 30.0-34.9)    Other orders  -     losartan (COZAAR) 50 MG tablet; Take 1 tablet by mouth 2 (Two) Times a Day.  Dispense: 180 tablet; Refill: 3             Cardiac status: Stable, most recent workup showed no ischemia, good LV function, possible apical infarct. ASA Continued, no repeat workup urged.     HTN: BP is stable. Will continue losartan 50mg BID as BP stable, no longer on HCTZ. Limited sodium " urged.      DM: Limited carb diet. Will continue glucophage, jardiance, and mounjaro. AIC with your office.     Mixed hyperlipidemia: On crestor and fish oil therapy. Will continue same. FLP with your office, checked a month or two ago. Limited carb diet urged.     Refills per request     BMI noted at 31.38, good cardiac ADA diet with limited carb, calories, and walking regimen urged.     6 month follow up advised, or sooner if needed.         Problems Addressed this Visit          Cardiac and Vasculature    Essential hypertension - Primary    Relevant Medications    losartan (COZAAR) 50 MG tablet       Endocrine and Metabolic    Type 2 diabetes mellitus without complication, without long-term current use of insulin     Other Visit Diagnoses       Mixed hyperlipidemia        Obesity (BMI 30.0-34.9)              Diagnoses         Codes Comments    Essential hypertension    -  Primary ICD-10-CM: I10  ICD-9-CM: 401.9     Mixed hyperlipidemia     ICD-10-CM: E78.2  ICD-9-CM: 272.2     Type 2 diabetes mellitus without complication, without long-term current use of insulin     ICD-10-CM: E11.9  ICD-9-CM: 250.00     Obesity (BMI 30.0-34.9)     ICD-10-CM: E66.811  ICD-9-CM: 278.00                       Electronically signed by Christiana Elizabeth, APRN November 20, 2024 12:11 EST

## 2025-02-18 RX ORDER — ROSUVASTATIN CALCIUM 10 MG/1
10 TABLET, COATED ORAL DAILY
Qty: 90 TABLET | Refills: 3 | Status: SHIPPED | OUTPATIENT
Start: 2025-02-18

## 2025-06-03 ENCOUNTER — OFFICE VISIT (OUTPATIENT)
Dept: CARDIOLOGY | Facility: CLINIC | Age: 60
End: 2025-06-03
Payer: COMMERCIAL

## 2025-06-03 VITALS
DIASTOLIC BLOOD PRESSURE: 80 MMHG | BODY MASS INDEX: 31.78 KG/M2 | OXYGEN SATURATION: 96 % | HEIGHT: 71 IN | WEIGHT: 227 LBS | HEART RATE: 74 BPM | SYSTOLIC BLOOD PRESSURE: 130 MMHG

## 2025-06-03 DIAGNOSIS — E66.811 OBESITY (BMI 30.0-34.9): ICD-10-CM

## 2025-06-03 DIAGNOSIS — E78.2 MIXED HYPERLIPIDEMIA: ICD-10-CM

## 2025-06-03 DIAGNOSIS — I10 ESSENTIAL HYPERTENSION: Primary | ICD-10-CM

## 2025-06-03 DIAGNOSIS — E11.9 TYPE 2 DIABETES MELLITUS WITHOUT COMPLICATION, WITHOUT LONG-TERM CURRENT USE OF INSULIN: ICD-10-CM

## 2025-06-03 RX ORDER — LOSARTAN POTASSIUM 50 MG/1
50 TABLET ORAL 2 TIMES DAILY
Qty: 180 TABLET | Refills: 3 | Status: SHIPPED | OUTPATIENT
Start: 2025-06-03

## 2025-06-03 NOTE — PROGRESS NOTES
Chief Complaint   Patient presents with    Follow-up     Cardiac Management - 8 month follow up     Stress test 2023  Echo 2023    Hypertension     He states he does check at home on occasion     No chest pain / tightness or palpitations    Over all doing good      labs     Last set 7/2024    medication     Went over verbally     Daily aspirin     Refill pended     Patient did not bring med list or medicine bottles to appointment, med list has been reviewed and updated based on patient's knowledge of their meds.         Cardiac Complaints  none      Subjective   Brian LILI Winters is a 59 y.o. male with HTN, diabetes, hyperlipidemia, and strong family history of stroke and heart disease.  Cardiac work-up in 2009 was negative.  We did not see him for a few years until recently when he returned for evaluation of increasing MCMULLEN and chest discomfort.  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 At consult, HCTZ 25 mg add for elevated bp also the SOB. Cardiac work up was ordered but not completed secondary to out-of-pocket cost >$2500. Overnight pulse ox was negative.  Repeat workup 2023 showed infarct without ischemia, HTN response, increase in cozaar advised.     He returns today for follow up and new concerns are denied. No CP, SOA, palpitations, dizziness, or syncope are noted. Labs with PCP, unsure of last check. Staying active without concerns. Refills requested.        Cardiac History  Past Surgical History:   Procedure Laterality Date    CARDIOVASCULAR STRESS TEST  07/14/2009    9 Min. 10.2 METS. 86% THR. BP- 184/92. EF 64%. Negative.    CARDIOVASCULAR STRESS TEST  11/22/2023    7.0 Min. 7.0 METS. 81% THR. 199/71. EF 56%. Apical Infarct    ECHO - CONVERTED  05/12/2009    EF > 60%. Mild MR. RVSP- 36 mmHg.    ECHO - CONVERTED  11/22/2023    EF 60%. Trace-Mild MR. RVSP- 12 mmHg       Current Outpatient Medications   Medication Sig Dispense Refill    aspirin 81 MG EC tablet Take 1 tablet by  mouth Daily.      empagliflozin (Jardiance) 10 MG tablet tablet Take 1 tablet by mouth Daily.      Esomeprazole Magnesium 20 MG tablet delayed-release Take 20 mg by mouth Every Morning Before Breakfast.      losartan (COZAAR) 50 MG tablet Take 1 tablet by mouth 2 (Two) Times a Day. 180 tablet 3    metFORMIN (GLUCOPHAGE) 500 MG tablet Take 2 tablets by mouth 2 (Two) Times a Day With Meals.      Omega-3 Fatty Acids (FISH OIL) 1000 MG capsule capsule Take 1 capsule by mouth Daily With Breakfast.      Tirzepatide (MOUNJARO) 7.5 MG/0.5ML solution pen-injector pen Inject 0.5 mL under the skin into the appropriate area as directed 1 (One) Time Per Week.       No current facility-administered medications for this visit.       Lisinopril    Past Medical History:   Diagnosis Date    Diabetes mellitus     History of vasectomy     Hyperlipidemia     Hypertension     Palpitations     Paresthesia of skin     Testicular hypofunction        Social History     Socioeconomic History    Marital status:    Tobacco Use    Smoking status: Never    Smokeless tobacco: Never   Vaping Use    Vaping status: Never Used   Substance and Sexual Activity    Alcohol use: Yes     Alcohol/week: 1.0 standard drink of alcohol     Types: 1 Drinks containing 0.5 oz of alcohol per week     Comment: Occasional    Drug use: No    Sexual activity: Yes     Partners: Female       Family History   Problem Relation Age of Onset    Transient ischemic attack Mother     Hypertension Mother     Diabetes Mother     Stroke Father     Transient ischemic attack Father     Hypertension Father     Diabetes Brother     Heart attack Maternal Uncle     Diabetes Paternal Grandmother     Diabetes Paternal Grandfather        Review of Systems   Constitutional: Negative for malaise/fatigue and night sweats.   Cardiovascular:  Negative for chest pain, claudication, dyspnea on exertion, irregular heartbeat, leg swelling, near-syncope, orthopnea, palpitations and syncope.  "  Respiratory:  Negative for shortness of breath and wheezing.    Musculoskeletal:  Positive for stiffness. Negative for back pain and joint pain.   Gastrointestinal:  Negative for anorexia, heartburn, nausea and vomiting.   Genitourinary:  Negative for dysuria, hematuria, hesitancy and nocturia.   Neurological:  Negative for dizziness, headaches and light-headedness.   Psychiatric/Behavioral:  Negative for depression and memory loss. The patient is not nervous/anxious.            Objective     /80 (BP Location: Left arm, Patient Position: Sitting, Cuff Size: Adult)   Pulse 74   Ht 180.3 cm (71\")   Wt 103 kg (227 lb)   SpO2 96%   BMI 31.66 kg/m²     Constitutional:       Appearance: Not in distress.   Eyes:      Pupils: Pupils are equal, round, and reactive to light.   HENT:      Nose: Nose normal.   Pulmonary:      Effort: Pulmonary effort is normal.      Breath sounds: Normal breath sounds.   Cardiovascular:      PMI at left midclavicular line. Normal rate. Regular rhythm.      Murmurs: There is a systolic murmur.   Abdominal:      Palpations: Abdomen is soft.   Musculoskeletal: Normal range of motion.      Cervical back: Normal range of motion and neck supple. Skin:     General: Skin is warm and dry.   Neurological:      Mental Status: Alert.         Procedures         Diagnoses and all orders for this visit:    1. Essential hypertension (Primary)    2. Mixed hyperlipidemia    3. Type 2 diabetes mellitus without complication, without long-term current use of insulin    4. Obesity (BMI 30.0-34.9)    Other orders  -     losartan (COZAAR) 50 MG tablet; Take 1 tablet by mouth 2 (Two) Times a Day.  Dispense: 180 tablet; Refill: 3               Cardiac status: Stable, most recent workup 2023 showed no ischemia, good LV function, possible apical infarct. ASA Continued, no repeat workup urged.     HTN: BP is stable. Will continue losartan 50mg BID. Limited sodium urged.      DM: Limited carb diet. Will " continue glucophage, jardiance, and mounjaro. AIC with your office.     Mixed hyperlipidemia: No longer on statin therapy, he admits to joint pain/muscle fatigue. Follows with you for FLP. If elevation noted, please consider adding different statin vs PCSK9.     Refills per request     BMI noted at 31.66, good cardiac ADA diet with limited carb, calories, and walking regimen urged.     6 month follow up advised, or sooner if needed.           Problems Addressed this Visit          Cardiac and Vasculature    Essential hypertension - Primary    Relevant Medications    losartan (COZAAR) 50 MG tablet       Endocrine and Metabolic    Type 2 diabetes mellitus without complication, without long-term current use of insulin     Other Visit Diagnoses         Mixed hyperlipidemia          Obesity (BMI 30.0-34.9)              Diagnoses         Codes Comments      Essential hypertension    -  Primary ICD-10-CM: I10  ICD-9-CM: 401.9       Mixed hyperlipidemia     ICD-10-CM: E78.2  ICD-9-CM: 272.2       Type 2 diabetes mellitus without complication, without long-term current use of insulin     ICD-10-CM: E11.9  ICD-9-CM: 250.00       Obesity (BMI 30.0-34.9)     ICD-10-CM: E66.811  ICD-9-CM: 278.00                           Electronically signed by Christiana Elizabeth, LEXII Lenore 3, 2025 08:59 EDT